# Patient Record
Sex: MALE | Race: WHITE | Employment: UNEMPLOYED | URBAN - METROPOLITAN AREA
[De-identification: names, ages, dates, MRNs, and addresses within clinical notes are randomized per-mention and may not be internally consistent; named-entity substitution may affect disease eponyms.]

---

## 2018-01-14 ENCOUNTER — HOSPITAL ENCOUNTER (EMERGENCY)
Age: 33
Discharge: HOME | End: 2018-01-14
Attending: EMERGENCY MEDICINE | Admitting: EMERGENCY MEDICINE
Payer: COMMERCIAL

## 2018-01-14 VITALS
HEIGHT: 72 IN | RESPIRATION RATE: 16 BRPM | TEMPERATURE: 98.4 F | BODY MASS INDEX: 20.05 KG/M2 | HEART RATE: 88 BPM | DIASTOLIC BLOOD PRESSURE: 80 MMHG | SYSTOLIC BLOOD PRESSURE: 156 MMHG | WEIGHT: 148 LBS | OXYGEN SATURATION: 100 %

## 2018-01-14 DIAGNOSIS — M26.609 TMJ DYSFUNCTION: Primary | ICD-10-CM

## 2018-01-14 PROCEDURE — 74011250636 HC RX REV CODE- 250/636: Performed by: PHYSICIAN ASSISTANT

## 2018-01-14 PROCEDURE — 99281 EMR DPT VST MAYX REQ PHY/QHP: CPT

## 2018-01-14 PROCEDURE — 96375 TX/PRO/DX INJ NEW DRUG ADDON: CPT

## 2018-01-14 PROCEDURE — 96361 HYDRATE IV INFUSION ADD-ON: CPT

## 2018-01-14 PROCEDURE — 96374 THER/PROPH/DIAG INJ IV PUSH: CPT

## 2018-01-14 PROCEDURE — 74011000250 HC RX REV CODE- 250: Performed by: PHYSICIAN ASSISTANT

## 2018-01-14 RX ORDER — PROCHLORPERAZINE EDISYLATE 5 MG/ML
10 INJECTION INTRAMUSCULAR; INTRAVENOUS
Status: DISCONTINUED | OUTPATIENT
Start: 2018-01-14 | End: 2018-01-14 | Stop reason: SDUPTHER

## 2018-01-14 RX ORDER — ONDANSETRON 2 MG/ML
4 INJECTION INTRAMUSCULAR; INTRAVENOUS
Status: COMPLETED | OUTPATIENT
Start: 2018-01-14 | End: 2018-01-14

## 2018-01-14 RX ORDER — DIPHENHYDRAMINE HYDROCHLORIDE 50 MG/ML
25 INJECTION, SOLUTION INTRAMUSCULAR; INTRAVENOUS
Status: COMPLETED | OUTPATIENT
Start: 2018-01-14 | End: 2018-01-14

## 2018-01-14 RX ORDER — KETOROLAC TROMETHAMINE 30 MG/ML
30 INJECTION, SOLUTION INTRAMUSCULAR; INTRAVENOUS
Status: COMPLETED | OUTPATIENT
Start: 2018-01-14 | End: 2018-01-14

## 2018-01-14 RX ORDER — DIAZEPAM 5 MG/1
5 TABLET ORAL
Qty: 20 TAB | Refills: 0 | Status: ON HOLD | OUTPATIENT
Start: 2018-01-14 | End: 2020-05-14

## 2018-01-14 RX ORDER — IBUPROFEN 800 MG/1
800 TABLET ORAL
Qty: 20 TAB | Refills: 0 | Status: SHIPPED | OUTPATIENT
Start: 2018-01-14 | End: 2018-01-21

## 2018-01-14 RX ADMIN — DIPHENHYDRAMINE HYDROCHLORIDE 25 MG: 50 INJECTION, SOLUTION INTRAMUSCULAR; INTRAVENOUS at 16:48

## 2018-01-14 RX ADMIN — KETOROLAC TROMETHAMINE 30 MG: 30 INJECTION, SOLUTION INTRAMUSCULAR at 17:50

## 2018-01-14 RX ADMIN — ONDANSETRON 4 MG: 2 INJECTION INTRAMUSCULAR; INTRAVENOUS at 16:48

## 2018-01-14 RX ADMIN — SODIUM CHLORIDE 10 MG: 9 INJECTION INTRAMUSCULAR; INTRAVENOUS; SUBCUTANEOUS at 16:48

## 2018-01-14 RX ADMIN — SODIUM CHLORIDE 1000 ML: 900 INJECTION, SOLUTION INTRAVENOUS at 16:47

## 2018-01-14 NOTE — DISCHARGE INSTRUCTIONS
Temporomandibular Disorder: Care Instructions  Your Care Instructions    Temporomandibular (TM) disorders are a problem with the muscles and joints that connect your jaw to your skull. They cause pain when you open your mouth, chew, or yawn. You may feel this pain on one or both sides. TM disorders are often caused by tight jaw muscles. The tightness can be caused by clenching or grinding your teeth. This may happen when you have a lot of stress in your life. If you lower your stress, you may be able to stop clenching or grinding your teeth. This will help relax your jaw and reduce your pain. You may also be able to do some things at home to feel better. But if none of this works, your doctor may prescribe medicine to help relax your muscles and control the pain. Follow-up care is a key part of your treatment and safety. Be sure to make and go to all appointments, and call your doctor if you are having problems. It's also a good idea to know your test results and keep a list of the medicines you take. How can you care for yourself at home? · Put a warm, moist cloth or heating pad set on low on your jaw. Do this for 10 to 20 minutes at a time. Put a thin cloth between the heating pad and your skin. · Avoid hard or chewy foods that cause your jaws to work very hard. Examples include popcorn, jerky, tough meats, chewy breads, gum, and raw apples and carrots. · Choose softer foods that are easy to chew. These include eggs, yogurt, and soup. · Cut your food into small pieces. Chew slowly. · If your jaw gets too painful to chew, or if it locks, you may need to puree your food for a few days or weeks. · To relax your jaw, repeat this exercise for a few minutes every morning and evening. Watch yourself in a mirror. Gently open and close your mouth. Move your jaw straight up and down. But don't do this if it makes your pain worse.   · Get at least 30 minutes of exercise on most days of the week to relieve stress. Walking is a good choice. You also may want to do other activities, such as running, swimming, cycling, or playing tennis or team sports. · Do not:  ¨ Hold a phone between your shoulder and your jaw. ¨ Open your mouth all the way, like when you sing loudly or yawn. ¨ Clench or grind your teeth, bite your lips, or chew your fingernails. ¨ Clench things such as pens, pipes, or cigars between your teeth. When should you call for help? Call your doctor now or seek immediate medical care if:  ? · Your jaw is locked open or shut or it is hard to move your jaw. ? Watch closely for changes in your health, and be sure to contact your doctor if:  ? · Your jaw pain gets worse. ? · Your face is swollen. ? · You do not get better as expected. Where can you learn more? Go to http://deidreDatamolinokristofer.info/. Enter T180 in the search box to learn more about \"Temporomandibular Disorder: Care Instructions. \"  Current as of: May 12, 2017  Content Version: 11.4  © 6797-2874 [employer]. Care instructions adapted under license by [employer] (which disclaims liability or warranty for this information). If you have questions about a medical condition or this instruction, always ask your healthcare professional. [address] any warranty or liability for your use of this information. We hope that we have addressed all of your medical concerns. The examination and treatment you received in the Emergency Department were for an emergent problem and were not intended as complete care. It is important that you follow up with your healthcare provider(s) for ongoing care. If your symptoms worsen or do not improve as expected, and you are unable to reach your usual health care provider(s), you should return to the Emergency Department.       Today's healthcare is undergoing tremendous change, and patient satisfaction surveys are one of the many tools to assess the quality of medical care. You may receive a survey from the [employer] organization regarding your experience in the Emergency Department. I hope that your experience has been completely positive, particularly the medical care that I provided. As such, please participate in the survey; anything less than excellent does not meet my expectations or intentions. [facility] and [facility] participate in nationally recognized quality of care measures. If your blood pressure is greater than 120/80, as reported below, we urge that you seek medical care to address the potential of high blood pressure, commonly known as hypertension. Hypertension can be hereditary or can be caused by certain medical conditions, pain, stress, or \"white coat syndrome. \"       Please make an appointment with your health care provider(s) for follow up of your Emergency Department visit. VITALS:   Patient Vitals for the past 8 hrs:   Temp Pulse Resp BP SpO2   01/14/18 1527 98.3 °F (36.8 °C) 93 16 134/87 99 %          Thank you for allowing us to provide you with medical care today. We realize that you have many choices for your emergency care needs. Please choose us in the future for any continued health care needs. [address], [address]: [phone]            No results found for this or any previous visit (from the past 24 hour(s)). No results found.

## 2018-01-14 NOTE — ED TRIAGE NOTES
Pt reports waking up with left jaw pain, left neck pain and pain above his left eye for the past 4 weeks. Pt states he was not able to close his mouth completely for a week but that has now subsided. Pt has been taking Excedrin, BC Powder, Motrin and Tylenol several times per day with minimal relief. Pt also reports light sensitivity.

## 2018-01-15 NOTE — ED PROVIDER NOTES
HPI Comments: 34 y.o. male with past medical history significant for bipolar disorder, substance abuse, and generalized anxiety disorder presents with complaints of left jaw pain headache and neck pain which began 4 weeks ago. The pt states that \"my jaw hurts when I chew food. \"  Denies drooling or trismus. The pt states that now he has a headache. The pt rates the pain as a 6/10 in severity. There is no radiation of the pain. The pt describes the pain as sharp and intermittent. The pt denies taking anything at home for the discomfort. There are no other acute medical complaints at this time. PCP: None    Domingo Tracy PA-C    Patient is a 34 y.o. male presenting with headaches. Headache    Pertinent negatives include no fever, no palpitations, no shortness of breath, no dizziness, no nausea and no vomiting. Past Medical History:   Diagnosis Date    Anxiety disorder     [facility] ([facility] [facility] 12/5/2011    Bipolar disorder ([facility] [facility]     Chronic pain     right foot and back pain    Depression     Mood disorder ([facility] [facility]     Self mutilation     hx of cutting self    Substance abuse     Suicidal thoughts        History reviewed. No pertinent surgical history. History reviewed. No pertinent family history. Social History     Social History    Marital status: SINGLE     Spouse name: N/A    Number of children: N/A    Years of education: N/A     Occupational History    Not on file.      Social History Main Topics    Smoking status: Current Every Day Smoker     Packs/day: 1.00     Years: 15.00     Types: Cigarettes    Smokeless tobacco: Never Used    Alcohol use Yes      Comment: Occasionally 10 beers a week    Drug use: No    Sexual activity: Yes     Partners: Female     Birth control/ protection: None     Other Topics Concern    Not on file     Social History Narrative         ALLERGIES: Ceclor [cefaclor]    Review of Systems   Constitutional: Negative for activity change, appetite change, diaphoresis and fever. HENT: Negative for ear discharge, ear pain, facial swelling, rhinorrhea, sore throat, tinnitus, trouble swallowing and voice change. Eyes: Negative for photophobia, pain, discharge, redness and visual disturbance. Respiratory: Negative for cough, chest tightness, shortness of breath, wheezing and stridor. Cardiovascular: Negative for chest pain and palpitations. Gastrointestinal: Negative for abdominal pain, constipation, diarrhea, nausea and vomiting. Endocrine: Negative for polydipsia and polyuria. Genitourinary: Negative for dysuria, flank pain and hematuria. Musculoskeletal: Negative for arthralgias, back pain and myalgias. Skin: Negative for color change and rash. Neurological: Positive for headaches. Negative for dizziness, syncope, speech difficulty, light-headedness and numbness. Psychiatric/Behavioral: Negative for behavioral problems. Vitals:    01/14/18 1527 01/14/18 1753   BP: 134/87 156/80   Pulse: 93 88   Resp: 16 16   Temp: 98.3 °F (36.8 °C) 98.4 °F (36.9 °C)   SpO2: 99% 100%   Weight: 67.1 kg (148 lb)    Height: 6' (1.829 m)             Physical Exam   Constitutional: He is oriented to person, place, and time. He appears well-developed and well-nourished. No distress. HENT:   Head: Normocephalic and atraumatic. Eyes: Conjunctivae are normal. Pupils are equal, round, and reactive to light. Neck: Normal range of motion. Neck supple. Cardiovascular: Normal rate, regular rhythm and normal heart sounds. Pulmonary/Chest: Effort normal and breath sounds normal. No respiratory distress. He has no wheezes. Abdominal: Soft. Bowel sounds are normal. He exhibits no distension. There is no tenderness. Musculoskeletal: Normal range of motion. No C, T, L, S spine tenderness. Pt has full mobility of upper and lower extremities. Pt is able to ambulate without difficulty. No perineal numbness. Pt is NVI.     Neurological: He is alert and oriented to person, place, and time. No Pronator Drift. Rapid Alternating movements are intact. Negative kernig's and brudzinski's sign. CN individually tested and are intact. Skin: Skin is warm. He is not diaphoretic. MDM  Number of Diagnoses or Management Options  TMJ dysfunction:   Diagnosis management comments: Pt reports relief of symptoms after fluids and headache medicines. Low index of suspicion for SAH, aneurysm, dissection, PE, MI, PTX or any other acute life threatening diseases. Symptoms consistent with TMJ disorder. Will treat with NSAIDs and muscle relaxer and advise close follow up with family doctor for further evaluation of symptoms. Reviewed treatment plan with attending and they agree.   Venus Allen PA-C    ED Course       Procedures

## 2019-09-09 ENCOUNTER — APPOINTMENT (OUTPATIENT)
Dept: CT IMAGING | Age: 34
End: 2019-09-09
Attending: EMERGENCY MEDICINE
Payer: COMMERCIAL

## 2019-09-09 ENCOUNTER — HOSPITAL ENCOUNTER (EMERGENCY)
Age: 34
Discharge: HOME | End: 2019-09-09
Attending: EMERGENCY MEDICINE
Payer: COMMERCIAL

## 2019-09-09 VITALS
SYSTOLIC BLOOD PRESSURE: 116 MMHG | DIASTOLIC BLOOD PRESSURE: 82 MMHG | OXYGEN SATURATION: 100 % | TEMPERATURE: 98 F | RESPIRATION RATE: 18 BRPM | HEART RATE: 74 BPM

## 2019-09-09 DIAGNOSIS — R51.9 NONINTRACTABLE HEADACHE, UNSPECIFIED CHRONICITY PATTERN, UNSPECIFIED HEADACHE TYPE: Primary | ICD-10-CM

## 2019-09-09 LAB
COMMENT, HOLDF: NORMAL
SAMPLES BEING HELD,HOLD: NORMAL

## 2019-09-09 PROCEDURE — 74011000250 HC RX REV CODE- 250: Performed by: EMERGENCY MEDICINE

## 2019-09-09 PROCEDURE — 70450 CT HEAD/BRAIN W/O DYE: CPT

## 2019-09-09 PROCEDURE — 74011250636 HC RX REV CODE- 250/636: Performed by: EMERGENCY MEDICINE

## 2019-09-09 PROCEDURE — 96374 THER/PROPH/DIAG INJ IV PUSH: CPT

## 2019-09-09 PROCEDURE — 36415 COLL VENOUS BLD VENIPUNCTURE: CPT

## 2019-09-09 PROCEDURE — 96375 TX/PRO/DX INJ NEW DRUG ADDON: CPT

## 2019-09-09 PROCEDURE — 99283 EMERGENCY DEPT VISIT LOW MDM: CPT

## 2019-09-09 RX ORDER — PROCHLORPERAZINE EDISYLATE 5 MG/ML
10 INJECTION INTRAMUSCULAR; INTRAVENOUS
Status: DISCONTINUED | OUTPATIENT
Start: 2019-09-09 | End: 2019-09-09 | Stop reason: CLARIF

## 2019-09-09 RX ORDER — DIPHENHYDRAMINE HYDROCHLORIDE 50 MG/ML
25 INJECTION, SOLUTION INTRAMUSCULAR; INTRAVENOUS
Status: COMPLETED | OUTPATIENT
Start: 2019-09-09 | End: 2019-09-09

## 2019-09-09 RX ADMIN — DIPHENHYDRAMINE HYDROCHLORIDE 25 MG: 50 INJECTION, SOLUTION INTRAMUSCULAR; INTRAVENOUS at 03:52

## 2019-09-09 RX ADMIN — SODIUM CHLORIDE 1000 ML: 900 INJECTION, SOLUTION INTRAVENOUS at 03:52

## 2019-09-09 RX ADMIN — SODIUM CHLORIDE 10 MG: 9 INJECTION INTRAMUSCULAR; INTRAVENOUS; SUBCUTANEOUS at 03:52

## 2019-09-09 NOTE — ED NOTES
Dr. Bhumika Mcpherson reviewed discharge instructions with the patient. The patient verbalized understanding. Patient ambulated out of the emergency department escorted by female . Patient remains in pain, but is in no apparent distress.

## 2019-09-09 NOTE — DISCHARGE INSTRUCTIONS

## 2019-09-09 NOTE — ED TRIAGE NOTES
Patient presents to the emergency department reporting sudden onset of intense headache onset about 45 minutes ago. Patient reports nausea. Patient received 30 mg of Toradol and 4 mg of Zofran en route.

## 2019-09-09 NOTE — ED PROVIDER NOTES
'had some drinks earluier today/ last one at 7:30 pm/ awoke 1 hour ago with a bad ha/ called 80    67-year-old male past medical history anxiety disorder, bipolar affective disorder, chronic pain of the right foot and back pain, depression, self-mutilation self cutting history, substance abuse and suicidal thoughts presents by ambulance complaining of a headache upon awakening this morning at approximately 1 AM.  Patient states he was fine when he went to bed earlier approximately 9. States he had been consuming alcohol through the day last drink was approximately 7:30 PM.  He states he does not normally get headaches and that why this was so concerning, causing him to call 911. Past Medical History:  No date: Anxiety disorder  12/5/2011: Bipolar [facility] disorder ([facility] [address]  No date: [facility]  No date: Chronic pain      Comment:  right foot and back pain  No date: Depression  No date: Mood disorder ([facility]  No date: Self mutilation      Comment:  hx of cutting self  No date: Substance abuse ([facility]  No date: Suicidal thoughts    History reviewed. No pertinent surgical history.     Social History    Socioeconomic History      Marital status: SINGLE      Spouse name: Not on file      Number of children: Not on file      Years of education: Not on file      Highest education level: Not on file    Occupational History      Not on file    Social Needs      Financial resource strain: Not on file      Food insecurity:        Worry: Not on file        Inability: Not on file      Transportation needs:        Medical: Not on file        Non-medical: Not on file    Tobacco Use      Smoking status: Current Every Day Smoker        Packs/day: 1.00        Years: 15.00        Pack years: 15        Types: Cigarettes      Smokeless tobacco: Never Used    Substance and Sexual Activity      Alcohol use: Yes        Comment: Occasionally 10 beers a week      Drug use: No      Sexual activity: Yes        Partners: Female Birth control/protection: None    Lifestyle      Physical activity:        Days per week: Not on file        Minutes per session: Not on file      Stress: Not on file    Relationships      Social connections:        Talks on phone: Not on file        Gets together: Not on file        Attends Buddhism service: Not on file        Active member of club or organization: Not on file        Attends meetings of clubs or organizations: Not on file        Relationship status: Not on file      Intimate partner violence:        Fear of current or ex partner: Not on file        Emotionally abused: Not on file        Physically abused: Not on file        Forced sexual activity: Not on file    Other Topics      Concerns:        Not on file    Social History Narrative      Not on file                 Past Medical History:   Diagnosis Date    Anxiety disorder     Bipolar affective disorder ([facility] [address] 12/5/2011    [facility]     Chronic pain     right foot and back pain    Depression     Mood disorder ([facility]     Self mutilation     hx of cutting self    Substance abuse     Suicidal thoughts        No past surgical history on file. No family history on file.     Social History     Socioeconomic History    Marital status: SINGLE     Spouse name: Not on file    Number of children: Not on file    Years of education: Not on file    Highest education level: Not on file   Occupational History    Not on file   Social Needs    Financial resource strain: Not on file    Food insecurity:     Worry: Not on file     Inability: Not on file    Transportation needs:     Medical: Not on file     Non-medical: Not on file   Tobacco Use    Smoking status: Current Every Day Smoker     Packs/day: 1.00     Years: 15.00     Pack years: 15.00     Types: Cigarettes    Smokeless tobacco: Never Used   Substance and Sexual Activity    Alcohol use: Yes     Comment: Occasionally 10 beers a week    Drug use: No    Sexual activity: Yes     Partners: Female     Birth control/protection: None   Lifestyle    Physical activity:     Days per week: Not on file     Minutes per session: Not on file    Stress: Not on file   Relationships    Social connections:     Talks on phone: Not on file     Gets together: Not on file     Attends Episcopal service: Not on file     Active member of club or organization: Not on file     Attends meetings of clubs or organizations: Not on file     Relationship status: Not on file    Intimate partner violence:     Fear of current or ex partner: Not on file     Emotionally abused: Not on file     Physically abused: Not on file     Forced sexual activity: Not on file   Other Topics Concern    Not on file   Social History Narrative    Not on file         ALLERGIES: Ceclor [cefaclor]    Review of Systems   Constitutional: Negative for chills and fever. HENT: Negative for trouble swallowing and voice change. Eyes: Negative for visual disturbance. Neurological: Positive for headaches. Negative for seizures and speech difficulty. All other systems reviewed and are negative. There were no vitals filed for this visit. Physical Exam   Constitutional: He is oriented to person, place, and time. He appears well-developed and well-nourished. No distress. BIBEMS Uncomfortable appearing, AxOx4, speaking in complete sentences, towell on his head;     gcs = 15   HENT:   Head: Normocephalic and atraumatic. Mouth/Throat: Oropharynx is clear and moist. No oropharyngeal exudate. Cn intact       Eyes: Pupils are equal, round, and reactive to light. Conjunctivae and EOM are normal. Right eye exhibits no discharge. Left eye exhibits no discharge. Neck: Normal range of motion. Neck supple. Cardiovascular: Normal rate, regular rhythm, normal heart sounds and intact distal pulses. Exam reveals no gallop and no friction rub. No murmur heard.   Pulmonary/Chest: Effort normal and breath sounds normal. No respiratory distress. He has no wheezes. He has no rales. He exhibits no tenderness. Abdominal: Soft. Bowel sounds are normal. He exhibits no distension and no mass. There is no tenderness. There is no rebound and no guarding. nttp   Genitourinary:   Genitourinary Comments: Pt denies urinary/ Testicular/ scrotal or penile  complaints   Musculoskeletal: Normal range of motion. He exhibits no edema, tenderness or deformity. Lymphadenopathy:     He has no cervical adenopathy. Neurological: He is alert and oriented to person, place, and time. He displays normal reflexes. No cranial nerve deficit or sensory deficit. He exhibits normal muscle tone. Coordination normal.   pt has motor/ CV/ Sensation grossly intact to all extremities, R = L in strength;   Skin: Skin is warm and dry. Capillary refill takes less than 2 seconds. No rash noted. No erythema. Psychiatric: He has a normal mood and affect. Nursing note and vitals reviewed. MDM       Procedures     4:22 AM Pt sleeping/ awakens to voice/ 'Ha better'; agrees w/ plans;   Nelly Calhonu  results have been reviewed with him. He has been counseled regarding his diagnosis. He verbally conveys understanding and agreement of the signs, symptoms, diagnosis, treatment and prognosis and additionally agrees to Call/ Arrange follow up as recommended in 24 - 48 hours. He also agrees with the care-plan and conveys that all of his questions have been answered. I have also put together some discharge instructions for him that include: 1) educational information regarding their diagnosis, 2) how to care for their diagnosis at home, as well a 3) list of reasons why they would want to return to the ED prior to their follow-up appointment, should their condition change or for concerns.

## 2020-02-04 ENCOUNTER — APPOINTMENT (OUTPATIENT)
Dept: GENERAL RADIOLOGY | Age: 35
End: 2020-02-04
Attending: EMERGENCY MEDICINE
Payer: COMMERCIAL

## 2020-02-04 ENCOUNTER — HOSPITAL ENCOUNTER (EMERGENCY)
Age: 35
Discharge: HOME | End: 2020-02-04
Attending: EMERGENCY MEDICINE | Admitting: EMERGENCY MEDICINE
Payer: COMMERCIAL

## 2020-02-04 VITALS
DIASTOLIC BLOOD PRESSURE: 72 MMHG | TEMPERATURE: 98.2 F | SYSTOLIC BLOOD PRESSURE: 111 MMHG | OXYGEN SATURATION: 99 % | HEART RATE: 73 BPM | RESPIRATION RATE: 16 BRPM

## 2020-02-04 DIAGNOSIS — S40.012A CONTUSION OF LEFT SHOULDER, INITIAL ENCOUNTER: ICD-10-CM

## 2020-02-04 DIAGNOSIS — S46.912A SHOULDER STRAIN, LEFT, INITIAL ENCOUNTER: Primary | ICD-10-CM

## 2020-02-04 PROCEDURE — 72050 X-RAY EXAM NECK SPINE 4/5VWS: CPT

## 2020-02-04 PROCEDURE — 74011250636 HC RX REV CODE- 250/636: Performed by: EMERGENCY MEDICINE

## 2020-02-04 PROCEDURE — 74011250637 HC RX REV CODE- 250/637: Performed by: EMERGENCY MEDICINE

## 2020-02-04 PROCEDURE — 73030 X-RAY EXAM OF SHOULDER: CPT

## 2020-02-04 PROCEDURE — 73502 X-RAY EXAM HIP UNI 2-3 VIEWS: CPT

## 2020-02-04 PROCEDURE — 99283 EMERGENCY DEPT VISIT LOW MDM: CPT

## 2020-02-04 PROCEDURE — 96372 THER/PROPH/DIAG INJ SC/IM: CPT

## 2020-02-04 RX ORDER — KETOROLAC TROMETHAMINE 30 MG/ML
30 INJECTION, SOLUTION INTRAMUSCULAR; INTRAVENOUS
Status: COMPLETED | OUTPATIENT
Start: 2020-02-04 | End: 2020-02-04

## 2020-02-04 RX ORDER — ONDANSETRON 4 MG/1
8 TABLET, ORALLY DISINTEGRATING ORAL
Status: COMPLETED | OUTPATIENT
Start: 2020-02-04 | End: 2020-02-04

## 2020-02-04 RX ORDER — CYCLOBENZAPRINE HCL 10 MG
10 TABLET ORAL
Status: COMPLETED | OUTPATIENT
Start: 2020-02-04 | End: 2020-02-04

## 2020-02-04 RX ORDER — IBUPROFEN 800 MG/1
800 TABLET ORAL
Qty: 20 TAB | Refills: 0 | Status: SHIPPED | OUTPATIENT
Start: 2020-02-04 | End: 2020-02-11

## 2020-02-04 RX ORDER — CYCLOBENZAPRINE HCL 10 MG
10 TABLET ORAL
Qty: 10 TAB | Refills: 0 | Status: ON HOLD | OUTPATIENT
Start: 2020-02-04 | End: 2020-05-14

## 2020-02-04 RX ADMIN — CYCLOBENZAPRINE 10 MG: 10 TABLET, FILM COATED ORAL at 06:23

## 2020-02-04 RX ADMIN — ONDANSETRON 8 MG: 4 TABLET, ORALLY DISINTEGRATING ORAL at 05:38

## 2020-02-04 RX ADMIN — KETOROLAC TROMETHAMINE 30 MG: 30 INJECTION, SOLUTION INTRAMUSCULAR at 05:51

## 2020-02-04 NOTE — DISCHARGE INSTRUCTIONS
Patient Education   Patient Education      Xrays tonight did not show any underlying bone injury (no fracture, dislocation, separation, etc.)  I suspect your pain is more from bruising and strain/spasm of the muscles surrounding your shoulder/back. I have prescribed high dose Ibuprofen for the pain as well as flexeril for muscle spasm. Apply warm heat for 20 minutes several times a day as well. You should be feeling better in 5-7 days. If your pain is getting worse, not better, or you develop new or worsening symptoms- return here for re-evaluation. Shoulder Pain: Care Instructions  Your Care Instructions    You can hurt your shoulder by using it too much during an activity, such as fishing or baseball. It can also happen as part of the everyday wear and tear of getting older. Shoulder injuries can be slow to heal, but your shoulder should get better with time. Your doctor may recommend a sling to rest your shoulder. If you have injured your shoulder, you may need testing and treatment. Follow-up care is a key part of your treatment and safety. Be sure to make and go to all appointments, and call your doctor if you are having problems. It's also a good idea to know your test results and keep a list of the medicines you take. How can you care for yourself at home? · Take pain medicines exactly as directed. ? If the doctor gave you a prescription medicine for pain, take it as prescribed. ? If you are not taking a prescription pain medicine, ask your doctor if you can take an over-the-counter medicine. ? Do not take two or more pain medicines at the same time unless the doctor told you to. Many pain medicines contain acetaminophen, which is Tylenol. Too much acetaminophen (Tylenol) can be harmful. · If your doctor recommends that you wear a sling, use it as directed. Do not take it off before your doctor tells you to. · Put ice or a cold pack on the sore area for 10 to 20 minutes at a time.  Put a thin cloth between the ice and your skin. · If there is no swelling, you can put moist heat, a heating pad, or a warm cloth on your shoulder. Some doctors suggest alternating between hot and cold. · Rest your shoulder for a few days. If your doctor recommends it, you can then begin gentle exercise of the shoulder, but do not lift anything heavy. When should you call for help? Call 911 anytime you think you may need emergency care. For example, call if:    · You have chest pain or pressure. This may occur with:  ? Sweating. ? Shortness of breath. ? Nausea or vomiting. ? Pain that spreads from the chest to the neck, jaw, or one or both shoulders or arms. ? Dizziness or lightheadedness. ? A fast or uneven pulse. After calling 911, chew 1 adult-strength aspirin. Wait for an ambulance. Do not try to drive yourself.     · Your arm or hand is cool or pale or changes color.    Call your doctor now or seek immediate medical care if:    · You have signs of infection, such as:  ? Increased pain, swelling, warmth, or redness in your shoulder. ? Red streaks leading from a place on your shoulder. ? Pus draining from an area of your shoulder. ? Swollen lymph nodes in your neck, armpits, or groin. ? A fever.    Watch closely for changes in your health, and be sure to contact your doctor if:    · You cannot use your shoulder.     · Your shoulder does not get better as expected. Where can you learn more? Go to http://deidre-kristofer.info/. Enter J328 in the search box to learn more about \"Shoulder Pain: Care Instructions. \"  Current as of: June 26, 2019  Content Version: 12.2  © 4011-6161 [employer]. Care instructions adapted under license by [employer] (which disclaims liability or warranty for this information).  If you have questions about a medical condition or this instruction, always ask your healthcare professional. [employer] disclaims any warranty or liability for your use of this information. Contusion: Care Instructions  Your Care Instructions    Contusion is the medical term for a bruise. It is the result of a direct blow or an impact, such as a fall. Contusions are common sports injuries. Most people think of a bruise as a black-and-blue spot. This happens when small blood vessels get torn and leak blood under the skin. But bones, muscles, and organs can also get bruised. This may damage deep tissues but not cause a bruise you can see. The doctor will do a physical exam to find the location of your contusion. You may also have tests to make sure you do not have a more serious injury, such as a broken bone or nerve damage. These may include X-rays or other imaging tests like a CT scan or MRI. Deep-tissue contusions may cause pain and swelling. But if there is no serious damage, they will often get better in a few weeks with home treatment. The doctor has checked you carefully, but problems can develop later. If you notice any problems or new symptoms, get medical treatment right away. Follow-up care is a key part of your treatment and safety. Be sure to make and go to all appointments, and call your doctor if you are having problems. It's also a good idea to know your test results and keep a list of the medicines you take. How can you care for yourself at home? · Put ice or a cold pack on the sore area for 10 to 20 minutes at a time to stop swelling. Put a thin cloth between the ice pack and your skin. · Be safe with medicines. Read and follow all instructions on the label. ? If the doctor gave you a prescription medicine for pain, take it as prescribed. ? If you are not taking a prescription pain medicine, ask your doctor if you can take an over-the-counter medicine. · If you can, prop up the sore area on pillows as much as possible for the next few days. Try to keep the sore area above the level of your heart.   When should you call for help?  Call your doctor now or seek immediate medical care if:    · Your pain gets worse.     · You have new or worse swelling.     · You have tingling, weakness, or numbness in the area near the contusion.     · The area near the contusion is cold or pale.    Watch closely for changes in your health, and be sure to contact your doctor if:    · You do not get better as expected. Where can you learn more? Go to http://deidre-kristofer.info/. Enter E262 in the search box to learn more about \"Contusion: Care Instructions. \"  Current as of: June 26, 2019  Content Version: 12.2  © 4364-5718 [employer]. Care instructions adapted under license by [employer] (which disclaims liability or warranty for this information). If you have questions about a medical condition or this instruction, always ask your healthcare professional. [address] any warranty or liability for your use of this information.

## 2020-02-04 NOTE — LETTER
James Perkins 54 
[address] [facility] [address] [id number] 
[phone] Work/School Note Date: 2/4/2020 To Whom It May concern: 
 
Gregg Neumann was seen and treated today in the emergency room by the following provider(s): 
Attending Provider: Dennys Holm MD. Gregg Neumann may return to work on Thursday Feb. 6. 2020. No heavy lifting over 5 pounds with left arm for 2 weeks.   
 
Sincerely, 
 
 
 
 
Beau Ramires MD
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
## 2020-05-14 ENCOUNTER — HOSPITAL ENCOUNTER (INPATIENT)
Age: 35
LOS: 2 days | Discharge: PSYCHIATRIC OR SUBSTANCE USE FACILITY | DRG: 917 | End: 2020-05-16
Attending: EMERGENCY MEDICINE | Admitting: INTERNAL MEDICINE
Payer: COMMERCIAL

## 2020-05-14 DIAGNOSIS — T50.902A INTENTIONAL DRUG OVERDOSE, INITIAL ENCOUNTER (HCC): Primary | ICD-10-CM

## 2020-05-14 DIAGNOSIS — T50.902A SUICIDE ATTEMPT BY DRUG INGESTION, INITIAL ENCOUNTER (HCC): ICD-10-CM

## 2020-05-14 PROBLEM — T50.901A OVERDOSE: Status: ACTIVE | Noted: 2020-05-14

## 2020-05-14 LAB
ALBUMIN SERPL-MCNC: 4.5 G/DL (ref 3.5–5)
ALBUMIN/GLOB SERPL: 1.2 {RATIO} (ref 1.1–2.2)
ALP SERPL-CCNC: 58 U/L (ref 45–117)
ALT SERPL-CCNC: 21 U/L (ref 12–78)
AMPHET UR QL SCN: POSITIVE
ANION GAP SERPL CALC-SCNC: 11 MMOL/L (ref 5–15)
APAP SERPL-MCNC: <2 UG/ML (ref 10–30)
APPEARANCE UR: CLEAR
AST SERPL-CCNC: 14 U/L (ref 15–37)
ATRIAL RATE: 99 BPM
BARBITURATES UR QL SCN: NEGATIVE
BASOPHILS # BLD: 0.1 K/UL (ref 0–0.1)
BASOPHILS NFR BLD: 1 % (ref 0–1)
BENZODIAZ UR QL: NEGATIVE
BILIRUB SERPL-MCNC: 0.2 MG/DL (ref 0.2–1)
BILIRUB UR QL: NEGATIVE
BUN SERPL-MCNC: 10 MG/DL (ref 6–20)
BUN/CREAT SERPL: 10 (ref 12–20)
CALCIUM SERPL-MCNC: 9.3 MG/DL (ref 8.5–10.1)
CALCULATED P AXIS, ECG09: 68 DEGREES
CALCULATED R AXIS, ECG10: 77 DEGREES
CALCULATED T AXIS, ECG11: 67 DEGREES
CANNABINOIDS UR QL SCN: NEGATIVE
CHLORIDE SERPL-SCNC: 106 MMOL/L (ref 97–108)
CO2 SERPL-SCNC: 20 MMOL/L (ref 21–32)
COCAINE UR QL SCN: NEGATIVE
COLOR UR: ABNORMAL
CREAT SERPL-MCNC: 1.04 MG/DL (ref 0.7–1.3)
DIAGNOSIS, 93000: NORMAL
DIFFERENTIAL METHOD BLD: NORMAL
DRUG SCRN COMMENT,DRGCM: ABNORMAL
EOSINOPHIL # BLD: 0.1 K/UL (ref 0–0.4)
EOSINOPHIL NFR BLD: 1 % (ref 0–7)
ERYTHROCYTE [DISTWIDTH] IN BLOOD BY AUTOMATED COUNT: 13.2 % (ref 11.5–14.5)
ETHANOL SERPL-MCNC: 96 MG/DL
GLOBULIN SER CALC-MCNC: 3.7 G/DL (ref 2–4)
GLUCOSE SERPL-MCNC: 101 MG/DL (ref 65–100)
GLUCOSE UR STRIP.AUTO-MCNC: NEGATIVE MG/DL
HCT VFR BLD AUTO: 47.9 % (ref 36.6–50.3)
HGB BLD-MCNC: 15.2 G/DL (ref 12.1–17)
HGB UR QL STRIP: NEGATIVE
IMM GRANULOCYTES # BLD AUTO: 0 K/UL (ref 0–0.04)
IMM GRANULOCYTES NFR BLD AUTO: 0 % (ref 0–0.5)
KETONES UR QL STRIP.AUTO: 15 MG/DL
LEUKOCYTE ESTERASE UR QL STRIP.AUTO: NEGATIVE
LYMPHOCYTES # BLD: 2.2 K/UL (ref 0.8–3.5)
LYMPHOCYTES NFR BLD: 24 % (ref 12–49)
MAGNESIUM SERPL-MCNC: 2.5 MG/DL (ref 1.6–2.4)
MCH RBC QN AUTO: 29.2 PG (ref 26–34)
MCHC RBC AUTO-ENTMCNC: 31.7 G/DL (ref 30–36.5)
MCV RBC AUTO: 92.1 FL (ref 80–99)
METHADONE UR QL: NEGATIVE
MONOCYTES # BLD: 0.8 K/UL (ref 0–1)
MONOCYTES NFR BLD: 8 % (ref 5–13)
NEUTS SEG # BLD: 6.1 K/UL (ref 1.8–8)
NEUTS SEG NFR BLD: 66 % (ref 32–75)
NITRITE UR QL STRIP.AUTO: NEGATIVE
NRBC # BLD: 0 K/UL (ref 0–0.01)
NRBC BLD-RTO: 0 PER 100 WBC
OPIATES UR QL: NEGATIVE
P-R INTERVAL, ECG05: 158 MS
PCP UR QL: NEGATIVE
PH UR STRIP: 5.5 [PH] (ref 5–8)
PHOSPHATE SERPL-MCNC: 3.2 MG/DL (ref 2.6–4.7)
PLATELET # BLD AUTO: 354 K/UL (ref 150–400)
PMV BLD AUTO: 9.4 FL (ref 8.9–12.9)
POTASSIUM SERPL-SCNC: 4.2 MMOL/L (ref 3.5–5.1)
PROT SERPL-MCNC: 8.2 G/DL (ref 6.4–8.2)
PROT UR STRIP-MCNC: NEGATIVE MG/DL
Q-T INTERVAL, ECG07: 382 MS
QRS DURATION, ECG06: 88 MS
QTC CALCULATION (BEZET), ECG08: 490 MS
RBC # BLD AUTO: 5.2 M/UL (ref 4.1–5.7)
SALICYLATES SERPL-MCNC: <1.7 MG/DL (ref 2.8–20)
SODIUM SERPL-SCNC: 137 MMOL/L (ref 136–145)
SP GR UR REFRACTOMETRY: 1.03 (ref 1–1.03)
UR CULT HOLD, URHOLD: NORMAL
UROBILINOGEN UR QL STRIP.AUTO: 0.2 EU/DL (ref 0.2–1)
VENTRICULAR RATE, ECG03: 99 BPM
WBC # BLD AUTO: 9.3 K/UL (ref 4.1–11.1)

## 2020-05-14 PROCEDURE — 96374 THER/PROPH/DIAG INJ IV PUSH: CPT

## 2020-05-14 PROCEDURE — 96375 TX/PRO/DX INJ NEW DRUG ADDON: CPT

## 2020-05-14 PROCEDURE — 85025 COMPLETE CBC W/AUTO DIFF WBC: CPT

## 2020-05-14 PROCEDURE — 74011250636 HC RX REV CODE- 250/636: Performed by: EMERGENCY MEDICINE

## 2020-05-14 PROCEDURE — 84100 ASSAY OF PHOSPHORUS: CPT

## 2020-05-14 PROCEDURE — 74011250637 HC RX REV CODE- 250/637: Performed by: NURSE PRACTITIONER

## 2020-05-14 PROCEDURE — 99285 EMERGENCY DEPT VISIT HI MDM: CPT

## 2020-05-14 PROCEDURE — 96361 HYDRATE IV INFUSION ADD-ON: CPT

## 2020-05-14 PROCEDURE — 81003 URINALYSIS AUTO W/O SCOPE: CPT

## 2020-05-14 PROCEDURE — 74011250637 HC RX REV CODE- 250/637: Performed by: INTERNAL MEDICINE

## 2020-05-14 PROCEDURE — 93005 ELECTROCARDIOGRAM TRACING: CPT

## 2020-05-14 PROCEDURE — 65660000001 HC RM ICU INTERMED STEPDOWN

## 2020-05-14 PROCEDURE — 74011250636 HC RX REV CODE- 250/636: Performed by: INTERNAL MEDICINE

## 2020-05-14 PROCEDURE — 83735 ASSAY OF MAGNESIUM: CPT

## 2020-05-14 PROCEDURE — 80053 COMPREHEN METABOLIC PANEL: CPT

## 2020-05-14 PROCEDURE — 80307 DRUG TEST PRSMV CHEM ANLYZR: CPT

## 2020-05-14 PROCEDURE — 90791 PSYCH DIAGNOSTIC EVALUATION: CPT

## 2020-05-14 PROCEDURE — 36415 COLL VENOUS BLD VENIPUNCTURE: CPT

## 2020-05-14 RX ORDER — SODIUM CHLORIDE 9 MG/ML
125 INJECTION, SOLUTION INTRAVENOUS CONTINUOUS
Status: DISCONTINUED | OUTPATIENT
Start: 2020-05-14 | End: 2020-05-15

## 2020-05-14 RX ORDER — CLONAZEPAM 1 MG/1
1 TABLET ORAL 2 TIMES DAILY
COMMUNITY
End: 2020-05-22

## 2020-05-14 RX ORDER — LORAZEPAM 2 MG/ML
0.5 INJECTION INTRAMUSCULAR
Status: COMPLETED | OUTPATIENT
Start: 2020-05-14 | End: 2020-05-14

## 2020-05-14 RX ORDER — LAMOTRIGINE 100 MG/1
100 TABLET ORAL
COMMUNITY
End: 2020-05-22

## 2020-05-14 RX ORDER — FLUOXETINE HYDROCHLORIDE 40 MG/1
40 CAPSULE ORAL DAILY
COMMUNITY
End: 2020-05-22

## 2020-05-14 RX ORDER — LORAZEPAM 2 MG/ML
2 INJECTION INTRAMUSCULAR
Status: DISCONTINUED | OUTPATIENT
Start: 2020-05-14 | End: 2020-05-16

## 2020-05-14 RX ORDER — IBUPROFEN 200 MG
1 TABLET ORAL EVERY 24 HOURS
Status: DISCONTINUED | OUTPATIENT
Start: 2020-05-14 | End: 2020-05-16

## 2020-05-14 RX ORDER — BUPROPION HYDROCHLORIDE 150 MG/1
150 TABLET ORAL DAILY
COMMUNITY
End: 2020-05-22

## 2020-05-14 RX ORDER — SODIUM CHLORIDE 0.9 % (FLUSH) 0.9 %
5-40 SYRINGE (ML) INJECTION EVERY 8 HOURS
Status: DISCONTINUED | OUTPATIENT
Start: 2020-05-14 | End: 2020-05-16 | Stop reason: HOSPADM

## 2020-05-14 RX ORDER — LANOLIN ALCOHOL/MO/W.PET/CERES
3 CREAM (GRAM) TOPICAL
Status: DISCONTINUED | OUTPATIENT
Start: 2020-05-14 | End: 2020-05-16 | Stop reason: HOSPADM

## 2020-05-14 RX ORDER — ONDANSETRON 2 MG/ML
4 INJECTION INTRAMUSCULAR; INTRAVENOUS
Status: COMPLETED | OUTPATIENT
Start: 2020-05-14 | End: 2020-05-14

## 2020-05-14 RX ORDER — SODIUM CHLORIDE 0.9 % (FLUSH) 0.9 %
5-40 SYRINGE (ML) INJECTION AS NEEDED
Status: DISCONTINUED | OUTPATIENT
Start: 2020-05-14 | End: 2020-05-16 | Stop reason: HOSPADM

## 2020-05-14 RX ADMIN — LORAZEPAM 2 MG: 2 INJECTION INTRAMUSCULAR; INTRAVENOUS at 15:57

## 2020-05-14 RX ADMIN — SODIUM CHLORIDE 125 ML/HR: 900 INJECTION, SOLUTION INTRAVENOUS at 12:15

## 2020-05-14 RX ADMIN — MELATONIN 3 MG: at 21:21

## 2020-05-14 RX ADMIN — LORAZEPAM 2 MG: 2 INJECTION INTRAMUSCULAR; INTRAVENOUS at 21:10

## 2020-05-14 RX ADMIN — Medication 10 ML: at 19:58

## 2020-05-14 RX ADMIN — ONDANSETRON 4 MG: 2 INJECTION INTRAMUSCULAR; INTRAVENOUS at 08:40

## 2020-05-14 RX ADMIN — LORAZEPAM 0.5 MG: 2 INJECTION INTRAMUSCULAR; INTRAVENOUS at 09:10

## 2020-05-14 RX ADMIN — LORAZEPAM 2 MG: 2 INJECTION INTRAMUSCULAR; INTRAVENOUS at 12:58

## 2020-05-14 RX ADMIN — SODIUM CHLORIDE 1000 ML: 900 INJECTION, SOLUTION INTRAVENOUS at 08:40

## 2020-05-14 RX ADMIN — Medication 10 ML: at 21:17

## 2020-05-14 RX ADMIN — Medication 10 ML: at 15:59

## 2020-05-14 RX ADMIN — SODIUM CHLORIDE 125 ML/HR: 900 INJECTION, SOLUTION INTRAVENOUS at 20:00

## 2020-05-14 RX ADMIN — LORAZEPAM 2 MG: 2 INJECTION INTRAMUSCULAR; INTRAVENOUS at 19:57

## 2020-05-14 NOTE — H&P
History and Physical  Primary Care Provider: None    Subjective:     Wendy Chi is a 27 y.o. male who presents with     Pt is a 27 y.o. M with PMH of anxiety and bipolar disorder and depression here with c/o overdose around 9:30 pm.  He took twenty-nine 150 mg wellbutrin tablets, twenty nine 40 mg prozac tablets, and thirty 100 mg lamictal tablets with alcohol. He text his girlfriend who called 911. Upon ems arrival to the seen they found 3 empty bottles. Pt says prior to last night he had only taken 1 tablet from the prozac and lamictal bottles thus it is assumed 29 tablets were present prior to overdose. He denies drug use. EMS also state pt had written on the wall DNR. Pt has had vomiting since the incident and says he did notice pill fragments initially. No other complaints at this time. Hx limited as pt keeps asking if he is dead or if this is really happening and continues to repeat himself. On my HP. Patient reported that he took multiple medications of Wellbutrin, Prozac, and Lamictal. Patient appears to be confused. But was able to answer my questions. Patient did not report any fever, any SOB, any other associated problems. Admitted to [facility]. I also talked ot ICU/CCU attending and updated them that we may need to contact you in case patient deteriorates. Review of Systems:    A comprehensive review of systems was negative except for that written in the History of Present Illness. Past Medical History:   Diagnosis Date    Anxiety disorder     Bipolar affective disorder ([facility] [address] 12/5/2011    Bipolar disorder ([facility] [address]     Chronic pain     right foot and back pain    Depression     Mood disorder ([facility] [address]     Self mutilation     hx of cutting self    Substance abuse ([facility] [address]     Suicidal thoughts       History reviewed. No pertinent surgical history. Prior to Admission medications    Medication Sig Start Date End Date Taking?  Authorizing Provider   cyclobenzaprine (FLEXERIL) 10 mg tablet Take 1 Tab by mouth three (3) times daily as needed for Muscle Spasm(s). 2/4/20   Briana Faustin MD   diazePAM (VALIUM) 5 mg tablet Take 1 Tab by mouth three (3) times daily as needed for Anxiety (spasm). Max Daily Amount: 15 mg. 1/14/18   Noni Bolivar PA-C   Desvenlafaxine SR (PRISTIQ) 50 mg tablet Take 50 mg by mouth daily. OtherShawna MD   Lisdexamfetamine (VYVANSE) 40 mg capsule Take 40 mg by mouth daily. Other, MD Shawna   clonazePAM (KLONOPIN) 1 mg tablet Take 1 mg by mouth as needed. OtherShawna MD   zolpidem (AMBIEN) 10 mg tablet Take 1 Tab by mouth nightly as needed for Sleep. 12/6/11   Pete Ross MD     Allergies   Allergen Reactions    Ceclor [Cefaclor] Unknown (comments)      History reviewed. No pertinent family history. SOCIAL HISTORY:  Patient resides at Home. Patient ambulates with out help . Tobacco Use    Smoking status: Current Every Day Smoker       Packs/day: 1.00       Years: 15.00       Pack years: 15.00       Types: Cigarettes    Smokeless tobacco: Never Used   Substance and Sexual Activity    Alcohol use: Yes       Comment: Occasionally 10 beers a week    Drug use: No    Sexual activity: Yes       Partners: Female       Birth control/protection: None             Objective:       Physical Exam:   Visit Vitals  /84   Pulse (!) 104   Temp 99.2 °F (37.3 °C)   Resp 20   SpO2 95%     General:  Alert, but appears to be confused . Head:  Normocephalic, without obvious abnormality, atraumatic. Lungs:   Clear to auscultation bilaterally. Chest wall:  No tenderness or deformity. Heart:  Regular rate and rhythm, S1, S2 normal, no murmur, click, rub or gallop. Abdomen:   Soft, non-tender. Bowel sounds normal. No masses,  No organomegaly. Extremities: Extremities normal, atraumatic, no cyanosis or edema. Pulses: 2+ and symmetric all extremities. ECG:  Normal sinus rhythm    Data Review:    All diagnostic labs and studies have been reviewed. Assessment:     Active Problems:    Overdose (5/14/2020)        Plan:     1. Overdose  Poison control was contacted by ED  Recommended to continue with lorazepam   Patient admitted to [facility]   Will closely monitor  If he develops any severe signs of serotonin syndrome likely transfer to ICU  At this point patient is clinically stable, not tachycardiac or tachypneaic, normal temperaure   Psychiatry consulted       # prolonged QTc  Likely due ot overdose  Will repeat EKG tomorrow AM    # confusion  Due ot overdose  On IV fluids  Likely will improve with time     # h/o bipolar disorder  Psychiatry consulted     #/ ho alcohol intake  CIWA protocol        FUNCTIONAL STATUS PRIOR TO HOSPITALIZATION (including history of recent falls):able to ambulate himself.       Signed By: Tamia Russell MD     May 14, 2020

## 2020-05-14 NOTE — ED TRIAGE NOTES
Patient presents from home via EMS with complaints of drug overdose that occurred \"sometime when it was dark\" last night. Patient reports takin 150 mg Wellbutrin  29 40 mg Prozac  30 100 mg Lamictal     Per EMs pateint admits to drinknig some ETOH.   EMS also reports there was \"natural burial\" and \"DNR\" written on the wall in his room

## 2020-05-14 NOTE — ED PROVIDER NOTES
HPI     Pt is a 27 y.o. M with PMH of anxiety and bipolar disorder and depression here with c/o overdose around 9:30 pm.  He took twenty-nine 150 mg wellbutrin tablets, twenty nine 40 mg prozac tablets, and thirty 100 mg lamictal tablets with alcohol. He text his girlfriend who called 911. Upon ems arrival to the seen they found 3 empty bottles. Pt says prior to last night he had only taken 1 tablet from the prozac and lamictal bottles thus it is assumed 29 tablets were present prior to overdose. He denies drug use. EMS also state pt had written on the wall DNR. Pt has had vomiting since the incident and says he did notice pill fragments initially. No other complaints at this time. Hx limited as pt keeps asking if he is dead or if this is really happening and continues to repeat himself. Past Medical History:   Diagnosis Date    Anxiety disorder     Bipolar affective disorder ([facility] 12/5/2011    Bipolar disorder ([facility]     Chronic pain     right foot and back pain    Depression     Mood disorder ([facility]     Self mutilation     hx of cutting self    Substance abuse ([facility]     Suicidal thoughts        History reviewed. No pertinent surgical history. History reviewed. No pertinent family history.     Social History     Socioeconomic History    Marital status: SINGLE     Spouse name: Not on file    Number of children: Not on file    Years of education: Not on file    Highest education level: Not on file   Occupational History    Not on file   Social Needs    Financial resource strain: Not on file    Food insecurity     Worry: Not on file     Inability: Not on file    Transportation needs     Medical: Not on file     Non-medical: Not on file   Tobacco Use    Smoking status: Current Every Day Smoker     Packs/day: 1.00     Years: 15.00     Pack years: 15.00     Types: Cigarettes    Smokeless tobacco: Never Used   Substance and Sexual Activity    Alcohol use: Yes     Comment: Occasionally 10 beers a week    Drug use: No    Sexual activity: Yes     Partners: Female     Birth control/protection: None   Lifestyle    Physical activity     Days per week: Not on file     Minutes per session: Not on file    Stress: Not on file   Relationships    Social connections     Talks on phone: Not on file     Gets together: Not on file     Attends Sikhism service: Not on file     Active member of club or organization: Not on file     Attends meetings of clubs or organizations: Not on file     Relationship status: Not on file    Intimate partner violence     Fear of current or ex partner: Not on file     Emotionally abused: Not on file     Physically abused: Not on file     Forced sexual activity: Not on file   Other Topics Concern    Not on file   Social History Narrative    Not on file         ALLERGIES: Ceclor [cefaclor]    Review of Systems   Constitutional: Positive for fatigue. Negative for chills, diaphoresis and fever. HENT: Negative for congestion and trouble swallowing. Eyes: Negative for photophobia and visual disturbance. Respiratory: Negative for cough, chest tightness and shortness of breath. Cardiovascular: Negative for chest pain, palpitations and leg swelling. Gastrointestinal: Positive for nausea and vomiting. Negative for abdominal pain and diarrhea. Genitourinary: Negative for difficulty urinating, dysuria, flank pain and frequency. Musculoskeletal: Negative for back pain and myalgias. Skin: Negative for rash and wound. Neurological: Positive for tremors, speech difficulty and weakness. Negative for dizziness, light-headedness and headaches. Hematological: Negative for adenopathy. Does not bruise/bleed easily. Psychiatric/Behavioral: Positive for confusion, self-injury and suicidal ideas. Negative for agitation. All other systems reviewed and are negative.       Vitals:    05/14/20 0835   BP: (!) 126/91   Pulse: (!) 105   Resp: 18   Temp: 98.9 °F (37.2 °C)   SpO2: 98%            Physical Exam  Constitutional:       General: He is in acute distress. Comments: Actively vomiting   HENT:      Head: Normocephalic and atraumatic. Mouth/Throat:      Mouth: Mucous membranes are dry. Eyes:      Pupils: Pupils are equal, round, and reactive to light. Comments: Pt with some nystagmus and unable to tract with EOM   Neck:      Musculoskeletal: Normal range of motion. No neck rigidity. Cardiovascular:      Rate and Rhythm: Regular rhythm. Tachycardia present. Pulses: Normal pulses. Heart sounds: Normal heart sounds. Pulmonary:      Effort: Pulmonary effort is normal.      Breath sounds: Normal breath sounds. Abdominal:      General: Abdomen is flat. Tenderness: There is no abdominal tenderness. Musculoskeletal:         General: No deformity or signs of injury. Right lower leg: No edema. Left lower leg: No edema. Skin:     General: Skin is warm and dry. Capillary Refill: Capillary refill takes less than 2 seconds. Coloration: Skin is pale. Findings: No erythema. Neurological:      Mental Status: He is alert and oriented to person, place, and time. Coordination: Coordination abnormal.      Gait: Gait abnormal (unsteady). Comments: Pt has slurred speech  He is repeating questions  Seems tremulous and mildly confused about the events of what happened but oriented. Psychiatric:         Mood and Affect: Mood is depressed. Affect is flat. Thought Content: Thought content includes suicidal ideation. Thought content includes suicidal plan. Judgment: Judgment is impulsive and inappropriate. MDM       Procedures    8:35 AM  Poison control notified ([place])  Somnolence, QT prolongation, HTN, tachycardia or dysrhythmia, serotonin syndrome, seizures, tremors, hyperthermia, CNS depression, confusion, slurred speech are all things to look for.   The Wellbutrin is most concerning and of course the combination of all is concerning. He is 12 hrs out. Recommended 24 admit. Benzo (for agitation, serotonin syndrome, seizure) and IVF for symptomatic care. ED EKG interpretation:  Rhythm: normal sinus rhythm; and regular . Rate (approx.): 99; Axis: normal; P wave: normal; QRS interval: normal ; prolonged QT; ST/T wave: normal; EKG documented by Aminta Rob MD, as interpreted by Orlando Issa MD, ED MD.      9:17 AM  Pt's mother has called but pt will not allow me to give her information about what happened. He will allow the nurse to speak to his partner. Mom ([address] [place] [address]) [phone]     Hospitalist Perfect Serve for Admission  10:03 AM to Dr. Maren Johns    ED Room Number: ER12/12  Patient Name and age:  Da De La Fuente 27 y.o.  male  Working Diagnosis:   1. Intentional drug overdose, initial encounter ([facility]    2. Suicide [facility] by drug ingestion, initial encounter ([facility]        COVID-19 Suspicion:  no    Code Status:  Full Code  Readmission: no  Isolation Requirements:  no  Recommended Level of Care:  telemetry  Department:[facility] Adult ED - 21   Other:  Pt overdosed on wellbutrin, lamictal and prozac at 9 pm yesterday. With tremors, slurred speech, some confusion and vomiting and prolonged QT and tachy. Poison center recommends 24 hr med admit with symptomatic care with benzo as needed and IVF and monitor for serotonin syndrome, seizures.     10:12 AM  Pt accepted by Dr. Evi Fofana MD

## 2020-05-14 NOTE — PROGRESS NOTES
1157: Received report from ED.     1204: Patient arrived on unit. Hooked up to monitor. Pt stuttering but oriented to person, place and situation. Disoriented to year. States he still feels better. Sitter at bedside for suicide precautions. 1400: Pt voided in urinal and tech accidentally dumped sample. Will collect next sample. 1629: Spoke with Ana from poison control. Updated physician. Bedside shift change report given to Stewart Potter RN (oncoming nurse) by Jose Antonio Reynolds RN (offgoing nurse). Report included the following information SBAR, Kardex, ED Summary, Intake/Output, MAR and Recent Results.

## 2020-05-14 NOTE — ROUTINE PROCESS
TRANSFER - OUT REPORT: 
 
Verbal report given to Viviane Boast RN(name) on [employer]  being transferred to CCU 22(unit) for routine progression of care Report consisted of patients Situation, Background, Assessment and  
Recommendations(SBAR). Information from the following report(s) SBAR, Kardex, ED Summary, [facility] and Recent Results was reviewed with the receiving nurse. Lines:  
Peripheral IV 05/14/20 Right Forearm (Active) Site Assessment Clean, dry, & intact 5/14/2020  8:37 AM  
Phlebitis Assessment 0 5/14/2020  8:37 AM  
Infiltration Assessment 0 5/14/2020  8:37 AM  
Dressing Status Clean, dry, & intact 5/14/2020  8:37 AM  
  
 
Opportunity for questions and clarification was provided. Patient transported with: 
 Monitor Registered Nurse

## 2020-05-14 NOTE — PROGRESS NOTES
IAdmission Medication Reconciliation:    Information obtained from: outpatient pharmacy [facility] @ [address] [phone]). RxQuery data available¹:  NO    Summary:     Medications added: bupropion, fluoxetine, lamotrigine  Medications deleted: cyclobenzaprine, desvenlafaxine, diazepam, zolpidem   Dose changes: clonazepam 1 mg BID (vs 1 mg PRN)    Inpatient orders were reviewed and no changes are needed. ¹RxQuery pharmacy benefit data reflects medications processed through the patient's insurance, however this data does NOT capture whether the medication is currently being taken by the patient. Allergies:  Ceclor [cefaclor]    Significant PMH/Disease States:   Past Medical History:   Diagnosis Date    Anxiety disorder     Bipolar affective disorder ([facility] 12/5/2011    Bipolar disorder ([facility]     Chronic pain     right foot and back pain    Depression     Mood disorder ([facility]     Self mutilation     hx of cutting self    Substance abuse ([facility]     Suicidal thoughts      Chief Complaint for this Admission:    Chief Complaint   Patient presents with    Drug Overdose     Prior to Admission Medications:   Prior to Admission Medications   Prescriptions Last Dose Informant Patient Reported? Taking? FLUoxetine (PROzac) 40 mg capsule   Yes Yes   Sig: Take 40 mg by mouth daily. buPROPion XL (WELLBUTRIN XL) 150 mg tablet   Yes Yes   Sig: Take 150 mg by mouth daily. clonazePAM (KlonoPIN) 1 mg tablet   Yes Yes   Sig: Take 1 mg by mouth two (2) times a day. lamoTRIgine (LaMICtal) 100 mg tablet   Yes Yes   Sig: Take 100 mg by mouth nightly. Facility-Administered Medications: None         Thank you for allowing me to participate in the care of this patient. Please contact the pharmacy () or the medication reconciliation pharmacist () with any questions. Luis Enrique Jackson, PharmDiego D., BCPS, BCPPS

## 2020-05-14 NOTE — BSMART NOTE
Consult placed for patient to be seen by [facility]. Writer went in once and patient struggling to share anything beyond him stating, \"I'm feeling confused. \" Patient unable to be assessed at this time. Patient to be medical admission thus psychiatric consult should be placed.

## 2020-05-15 LAB
ALBUMIN SERPL-MCNC: 3.4 G/DL (ref 3.5–5)
ALBUMIN/GLOB SERPL: 1.3 {RATIO} (ref 1.1–2.2)
ALP SERPL-CCNC: 46 U/L (ref 45–117)
ALT SERPL-CCNC: 15 U/L (ref 12–78)
ANION GAP SERPL CALC-SCNC: 6 MMOL/L (ref 5–15)
AST SERPL-CCNC: 11 U/L (ref 15–37)
BASOPHILS # BLD: 0.1 K/UL (ref 0–0.1)
BASOPHILS NFR BLD: 1 % (ref 0–1)
BILIRUB SERPL-MCNC: 0.6 MG/DL (ref 0.2–1)
BUN SERPL-MCNC: 9 MG/DL (ref 6–20)
BUN/CREAT SERPL: 10 (ref 12–20)
CALCIUM SERPL-MCNC: 8.4 MG/DL (ref 8.5–10.1)
CHLORIDE SERPL-SCNC: 108 MMOL/L (ref 97–108)
CO2 SERPL-SCNC: 26 MMOL/L (ref 21–32)
CREAT SERPL-MCNC: 0.91 MG/DL (ref 0.7–1.3)
DIFFERENTIAL METHOD BLD: ABNORMAL
EOSINOPHIL # BLD: 0.2 K/UL (ref 0–0.4)
EOSINOPHIL NFR BLD: 2 % (ref 0–7)
ERYTHROCYTE [DISTWIDTH] IN BLOOD BY AUTOMATED COUNT: 13.3 % (ref 11.5–14.5)
GLOBULIN SER CALC-MCNC: 2.7 G/DL (ref 2–4)
GLUCOSE SERPL-MCNC: 93 MG/DL (ref 65–100)
HCT VFR BLD AUTO: 38.3 % (ref 36.6–50.3)
HGB BLD-MCNC: 12.1 G/DL (ref 12.1–17)
IMM GRANULOCYTES # BLD AUTO: 0.1 K/UL (ref 0–0.04)
IMM GRANULOCYTES NFR BLD AUTO: 1 % (ref 0–0.5)
LYMPHOCYTES # BLD: 2.2 K/UL (ref 0.8–3.5)
LYMPHOCYTES NFR BLD: 22 % (ref 12–49)
MCH RBC QN AUTO: 29.2 PG (ref 26–34)
MCHC RBC AUTO-ENTMCNC: 31.6 G/DL (ref 30–36.5)
MCV RBC AUTO: 92.5 FL (ref 80–99)
MONOCYTES # BLD: 1.1 K/UL (ref 0–1)
MONOCYTES NFR BLD: 12 % (ref 5–13)
NEUTS SEG # BLD: 6.2 K/UL (ref 1.8–8)
NEUTS SEG NFR BLD: 64 % (ref 32–75)
NRBC # BLD: 0 K/UL (ref 0–0.01)
NRBC BLD-RTO: 0 PER 100 WBC
PLATELET # BLD AUTO: 250 K/UL (ref 150–400)
PMV BLD AUTO: 9.2 FL (ref 8.9–12.9)
POTASSIUM SERPL-SCNC: 3.5 MMOL/L (ref 3.5–5.1)
PROT SERPL-MCNC: 6.1 G/DL (ref 6.4–8.2)
RBC # BLD AUTO: 4.14 M/UL (ref 4.1–5.7)
SODIUM SERPL-SCNC: 140 MMOL/L (ref 136–145)
WBC # BLD AUTO: 9.8 K/UL (ref 4.1–11.1)

## 2020-05-15 PROCEDURE — 74011250637 HC RX REV CODE- 250/637: Performed by: NURSE PRACTITIONER

## 2020-05-15 PROCEDURE — 85025 COMPLETE CBC W/AUTO DIFF WBC: CPT

## 2020-05-15 PROCEDURE — 65660000000 HC RM CCU STEPDOWN

## 2020-05-15 PROCEDURE — 36415 COLL VENOUS BLD VENIPUNCTURE: CPT

## 2020-05-15 PROCEDURE — 74011250636 HC RX REV CODE- 250/636: Performed by: INTERNAL MEDICINE

## 2020-05-15 PROCEDURE — 80053 COMPREHEN METABOLIC PANEL: CPT

## 2020-05-15 PROCEDURE — 74011250637 HC RX REV CODE- 250/637: Performed by: INTERNAL MEDICINE

## 2020-05-15 RX ADMIN — LORAZEPAM 2 MG: 2 INJECTION INTRAMUSCULAR; INTRAVENOUS at 21:23

## 2020-05-15 RX ADMIN — Medication 10 ML: at 14:30

## 2020-05-15 RX ADMIN — LORAZEPAM 2 MG: 2 INJECTION INTRAMUSCULAR; INTRAVENOUS at 08:15

## 2020-05-15 RX ADMIN — Medication 10 ML: at 21:24

## 2020-05-15 RX ADMIN — LORAZEPAM 2 MG: 2 INJECTION INTRAMUSCULAR; INTRAVENOUS at 17:52

## 2020-05-15 RX ADMIN — MELATONIN 3 MG: at 21:23

## 2020-05-15 RX ADMIN — MULTIPLE VITAMINS W/ MINERALS TAB 1 TABLET: TAB at 08:15

## 2020-05-15 RX ADMIN — Medication 10 ML: at 04:06

## 2020-05-15 RX ADMIN — LORAZEPAM 2 MG: 2 INJECTION INTRAMUSCULAR; INTRAVENOUS at 04:07

## 2020-05-15 RX ADMIN — SODIUM CHLORIDE 125 ML/HR: 900 INJECTION, SOLUTION INTRAVENOUS at 04:02

## 2020-05-15 NOTE — PROGRESS NOTES
Spiritual Care Assessment/Progress Note  [facility]      NAME: Jose Reza      MRN: [id number]  AGE: 27 y.o.  SEX: male  Worship Affiliation: [employer]   Language: English     5/15/2020     Total Time (in minutes): 16     Spiritual Assessment begun in [facility] CORONARY CARE through conversation with:         [x]Patient        [] Family    [] Friend(s)        Reason for Consult: Initial/Spiritual assessment, critical care     Spiritual beliefs: (Please include comment if needed)     [x] Identifies with a zhane tradition:    Emmett Reyes      [] Supported by a zhane community:            [] Claims no spiritual orientation:           [] Seeking spiritual identity:                [] Adheres to an individual form of spirituality:           [] Not able to assess:                           Identified resources for coping:      [x] Prayer                               [] Music                  [] Guided Imagery     [x] Family/friends                 [] Pet visits     [] Devotional reading                         [] Unknown     [] Other:                                              Interventions offered during this visit: (See comments for more details)    Patient Interventions: Affirmation of emotions/emotional suffering, Affirmation of zhane, Catharsis/review of pertinent events in supportive environment, Iconic (affirming the presence of God/Higher Power), Initial/Spiritual assessment, Critical care, Normalization of emotional/spiritual concerns, Prayer (actual), Prayer (assurance of)           Plan of Care:     [x] Support spiritual and/or cultural needs    [] Support AMD and/or advance care planning process      [] Support grieving process   [] Coordinate Rites and/or Rituals    [] Coordination with community clergy   [] No spiritual needs identified at this time   [] Detailed Plan of Care below (See Comments)  [] Make referral to Music Therapy  [] Make referral to Pet Therapy     [] Make referral to Addiction services  [] Make referral to [facility]  [] Make referral to Spiritual Care Partner  [] No future visits requested        [] Follow up visits as needed     Comments: Initial visit with patient. Pastoral presence and comfort. Pt recently lost his job, but said he was not feeling undue stress due to job loss. We spoke of compilation of things and how patient dealt with things. Merced Brown expressed more concern and anxiety about dealing with the aftermath. Spoke of ways to cope and to be gentle with himself - spoke of uselessness of feeling shame. Assured Shin of pastoral care support. Pt is loosely affiliated with [employer] - shared of  God's love. Spiritual care is available to follow as needed. He identifies his mother as a resource for himself. [phone]. Visit by: Hayley Patel.  Jasmin Hamilton D.Min, MA, [facility]    Lead  Profession Development & Advancement

## 2020-05-15 NOTE — PROGRESS NOTES
0730: Bedside shift change report given to RUFUS Carranza (oncoming nurse) by Carlton Liu RN (offgoing nurse). Report included the following information SBAR, Kardex, ED Summary, Procedure Summary, Intake/Output, MAR, Recent Results and Cardiac Rhythm NSR.    0815: CIWA 9, Ativan administered. 0830: Dr Amos Gilliam, hospitalist, assessing pt at the bedside. 0900: Pt's No KLEIN, called and received pt update. 1000: Pt's mother called and received pt update. 1115: Shahriar Sin, spiritual care, speaking with pt at the bedside. 1200: Chloe MONSIVAIS, psychiatry, assessing pt at the bedside. 1210: RN and PCT/sitter assisted pt up to the [facility] with minimal assistance. 1430: Chloe NP placed orders to transfer pt to behavioral health unit once pt is medically cleared. 1435: Dr Amos Gilliam paged and MD to place discharge orders. 1530: Poison Control called RN and closed the pt's case. 1615: 7W bed board notified this RN that most likely a bed will not be available for this pt until tomorrow afternoon. 1630: Pt received remote telemetry bed assignment. 1650: TRANSFER - OUT REPORT:    Verbal report given to RUFUS Martinez(name) on [employer]  being transferred to [facility](unit) for routine progression of care       Report consisted of patients Situation, Background, Assessment and   Recommendations(SBAR). Information from the following report(s) SBAR, Kardex, ED Summary, Intake/Output, MAR, Recent Results and Cardiac Rhythm NSR was reviewed with the receiving nurse. Lines:   Peripheral IV 05/14/20 Right Forearm (Active)   Phlebitis Assessment 0 5/15/2020 12:00 PM   Infiltration Assessment 0 5/15/2020 12:00 PM   Dressing Status Clean, dry, & intact 5/15/2020 12:00 PM   Dressing Type Transparent 5/15/2020 12:00 PM   Hub Color/Line Status Green; Infusing 5/15/2020 12:00 PM   Action Taken Open ports on tubing capped 5/15/2020 12:00 PM   Alcohol Cap Used Yes 5/15/2020 12:00 PM        Opportunity for questions and clarification was provided.       Patient transported with:   Monitor  Registered Nurse  Tech

## 2020-05-15 NOTE — PROGRESS NOTES
2000 Received report from [facility] and assumed care. VSS, denies pain. CIWA 14, ativan given. Sitter at bedside. Call bell at side. 2110 CIWA 18, ativan given. Konrad Childs, NP and melatonin ordered per pt request for assistance sleeping. 2130 Poison control called and update provided. 2200 Talking to mom on phone. 0000 Resting quietly, easily awakened. VSS, denies pain, assessment unchanged. Sitter at bedside. 0400 CIWA 10, ativan given. Labs drawn. 0730 Bedside and Verbal shift change report given to Tere (oncoming nurse) by Mony Rashid (offgoing nurse). Report included the following information SBAR, Kardex, Intake/Output, MAR, Recent Results and Alarm Parameters .

## 2020-05-15 NOTE — PROGRESS NOTES
Hospitalist Progress Note  Jigar Brandon MD  Answering service: [phone] OR [phone] from in house phone        Date of Service:  5/15/2020  NAME:  Julian Vegas  :  1985  MRN:  [id number]      Admission Summary:   As per initial admission summary  Pt is a 27 y.o. M with PMH of anxiety and bipolar disorder and depression here with c/o overdose around 9:30 pm.  He took twenty-nine 150 mg wellbutrin tablets, twenty nine 40 mg prozac tablets, and thirty 100 mg lamictal tablets with alcohol.   He text his girlfriend who called Gideon Elroy ems arrival to the seen they found 3 empty bottles. Pt says prior to last night he had only taken 1 tablet from the prozac and lamictal bottles thus it is assumed 29 tablets were present prior to overdose. He denies drug use.  EMS also state pt had written on the wall DNR.  Pt has had vomiting since the incident and says he did notice pill fragments initially.  No other complaints at this time.  Hx limited as pt keeps asking if he is dead or if this is really happening and continues to repeat himself.     On my HP. Patient reported that he took multiple medications of Wellbutrin, Prozac, and Lamictal. Patient appears to be confused. But was able to answer my questions. Patient did not report any fever, any SOB, any other associated problems. Admitted to [facility]. I also talked ot ICU/CCU attending and updated them that we may need to contact you in case patient deteriorates. Interval history / Subjective:     Patient seen for Follow up of drug overdose     Patient seen and examined by the bedside, Labs, images and notes reviewed  Patient is comfortable, denies any chest pain, SOB, abdominal pain, fevers, chills. No N/V/D  Discussed with nursing staff, no acute issues overnight, orders reviewed. Looks more alert and oriented  Still has some tremors. Transferred to psych likely tomorrow.  No bed available today Assessment & Plan:     1. Overdose  Poison control was contacted by ED  Recommended to continue with lorazepam   Patient admitted to [facility]   Will closely monitor  If he develops any severe signs of serotonin syndrome likely transfer to ICU  At this point patient is clinically stable, not tachycardiac or tachypneaic, normal temperaure   Psychiatry consulted   Will transfer to psych likely tomorrow      # prolonged QTc  Likely due ot overdose  Monitor      # confusion, resolved   Due ot overdose  Encephalopathy due to medications or drugs ,   Wellbutrin, prozac tablets and lamicta  On IV fluids  Likely will improve with time      # h/o bipolar disorder  Psychiatry consulted      #/ ho alcohol intake    Code status: full code   DVT prophylaxis: [address] discussed with: Patient/Family  Disposition: Home      Hospital Problems  Date Reviewed: 12/6/2011          Codes Class Noted POA    Overdose ICD-10-CM: T50.901A  ICD-9-CM: 977.9, E980.5  5/14/2020 Unknown                Review of Systems:   A comprehensive review of systems was negative except for that written in the HPI. Vital Signs:    Last 24hrs VS reviewed since prior progress note. Most recent are:  Visit Vitals  BP (!) 112/93 (BP 1 Location: Right arm, BP Patient Position: At rest)   Pulse 85   Temp 98.5 °F (36.9 °C)   Resp 15   Wt 65 kg (143 lb 4.8 oz)   SpO2 95%   BMI 19.43 kg/m²         Intake/Output Summary (Last 24 hours) at 5/15/2020 1541  Last data filed at 5/15/2020 1200  Gross per 24 hour   Intake 3068.75 ml   Output 900 ml   Net 2168.75 ml        Physical Examination:             Constitutional:  No acute distress, cooperative, pleasant    ENT:  Oral mucous moist, oropharynx benign. Neck supple,    Resp:  CTA bilaterally. No wheezing/rhonchi/rales. No accessory muscle use   CV:  Regular rhythm, normal rate, no murmurs, gallops, rubs    GI:  Soft, non distended, non tender.  normoactive bowel sounds, no hepatosplenomegaly Musculoskeletal:  No edema, warm, 2+ pulses throughout    Neurologic:  Moves all extremities. AAOx3, CN II-XII reviewed     Psych:  Good insight, Not anxious nor agitated. Skin:  Good turgor, no rashes or ulcers  Hematologic/Lymphatic/Immunlogic:  No jaundice nor lymph node swelling  Eyes:  EOMI. Anicteric sclerae, PERRL. Data Review:    Review and/or order of clinical lab test  Review and/or order of tests in the radiology section of [facility]      Labs:     Recent Labs     05/15/20  0401 05/14/20  0833   WBC 9.8 9.3   HGB 12.1 15.2   HCT 38.3 47.9    354     Recent Labs     05/15/20  0401 05/14/20  0833    137   K 3.5 4.2    106   CO2 26 20*   BUN 9 10   CREA 0.91 1.04   GLU 93 101*   CA 8.4* 9.3   MG  --  2.5*   PHOS  --  3.2     Recent Labs     05/15/20  0401 05/14/20  0833   SGOT 11* 14*   ALT 15 21   AP 46 58   TBILI 0.6 0.2   TP 6.1* 8.2   ALB 3.4* 4.5   GLOB 2.7 3.7     No results for input(s): INR, PTP, APTT, INREXT in the last 72 hours. No results for input(s): FE, TIBC, PSAT, FERR in the last 72 hours. No results found for: FOL, RBCF   No results for input(s): PH, PCO2, PO2 in the last 72 hours. No results for input(s): CPK, CKNDX, TROIQ in the last 72 hours.     No lab exists for component: CPKMB  No results found for: CHOL, CHOLX, CHLST, CHOLV, HDL, HDLP, LDL, LDLC, DLDLP, TGLX, TRIGL, TRIGP, CHHD, CHHDX  No results found for: [facility]  Lab Results   Component Value Date/Time    Color YELLOW/STRAW 05/14/2020 08:11 PM    Appearance CLEAR 05/14/2020 08:11 PM    Specific gravity 1.027 05/14/2020 08:11 PM    Specific gravity 1.020 10/29/2010 02:55 AM    pH (UA) 5.5 05/14/2020 08:11 PM    Protein Negative 05/14/2020 08:11 PM    Glucose Negative 05/14/2020 08:11 PM    Ketone 15 (A) 05/14/2020 08:11 PM    Bilirubin Negative 05/14/2020 08:11 PM    Urobilinogen 0.2 05/14/2020 08:11 PM    Nitrites Negative 05/14/2020 08:11 PM    Leukocyte Esterase Negative 05/14/2020 08:11 PM    Epithelial cells FEW 12/20/2015 05:03 AM    Bacteria NEGATIVE  12/20/2015 05:03 AM    WBC 0-4 12/20/2015 05:03 AM    RBC 0-5 12/20/2015 05:03 AM         Medications Reviewed:     Current Facility-Administered Medications   Medication Dose Route Frequency    sodium chloride (NS) flush 5-40 mL  5-40 mL IntraVENous Q8H    sodium chloride (NS) flush 5-40 mL  5-40 mL IntraVENous PRN    LORazepam (ATIVAN) injection 2 mg  2 mg IntraVENous Q1H PRN    multivitamin, tx-iron-ca-min (THERA-M w/ IRON) tablet 1 Tab  1 Tab Oral DAILY    nicotine (NICODERM CQ) 14 mg/24 hr patch 1 Patch  1 Patch TransDERmal Q24H    melatonin tablet 3 mg  3 mg Oral QHS PRN     ______________________________________________________________________  EXPECTED LENGTH OF STAY: 5d 19h  ACTUAL LENGTH OF STAY:          Bari Shepherd MD

## 2020-05-15 NOTE — CONSULTS
Damir Peterson    Name:  Abdirizak Hardin  MR#:  [id number]  :  1985  ACCOUNT #:  [de-identified]  DATE OF SERVICE:  05/15/2020    BEHAVIORAL HEALTH CONSULTATION    CHIEF COMPLAINT:  \"I felt depressed. \"    HISTORY OF PRESENT ILLNESS:  The patient is a 69-year-old male who is currently being seen at [facility] for psychiatric consultation after he overdosed on his medications. He is currently seeing Ana Luisa Mcpherson, a PMHNP at [facility]. He carries a diagnosis of bipolar I disorder and was prescribed Wellbutrin, Prozac and lamotrigine. He states that he had a pretty good day before the incident, but then all of a sudden, he felt \"deep depression. \"  He then ended up taking his recently filled medications. He took 29 tablets Wellbutrin 150 mg, 29 tablets of the Prozac 40 mg, and 30 tablets of the Lamictal 100 mg with alcohol. His BAL on admission was 96, and he was positive for amphetamines. He is aware that he has a drinking problem. He drinks about everyday, about 5 or more drinks. He states that his most recent manic episode was about 3 months ago. He states that right after he overdose, he texted his girlfriend who called 911. EMS found 3 empty bottles right next to him. EMS also reported on admission that he had written on the wall DNR. 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 He appeared very depressed during the interview. He is amenable to voluntary admission at [facility]. He currently denies suicidal ideation, homicidal ideation, auditory or visual hallucination. PAST MEDICAL HISTORY:  See H and P. PAST PSYCHIATRIC HISTORY:  He states that he has been admitted to different psychiatric facilities such as [facility] and [facility].  He carries a diagnosis of bipolar I disorder.   He is currently seeing Gunner Carbajal, a PMHNP at [facility]. PSYCHOSOCIAL HISTORY:  He is single. He has no children. He lives with 2 roommates. MENTAL STATUS EXAMINATION:  He is alert and oriented in all spheres. He is dressed in hospital apparel. He reports his mood is okay. Affect is blunted. Speech normal rate and rhythm. Thought process:  Logical and goal directed. He currently denies suicidal ideation or homicidal ideation. Memory seems intact. Intelligence is average. Insight is partial.  Judgment is poor. ASSESSMENT AND PLAN:  The patient has been diagnosed with bipolar I disorder, current episode depressed, without psychotic features. Please continue holding psychotropic medications. He is amenable to voluntary admission to [facility]. Please have him transferred once he is medically stable. Continue sitter until he gets transferred. Thank you for this consult. Please call with questions.         TAMI FRIEND NP      [id number]  D:  05/15/2020 14:15  T:  05/15/2020 17:00  JOB #:  [id number]

## 2020-05-15 NOTE — CDMP QUERY
Pt admitted with Overdose, Confusion documented. If possible, please document in the progress note and D/C Summary if you are evaluating and/or treating any of the following:  Alcoholic Encephalopathy  Metabolic Encephalopathy  Septic Encephalopathy  Toxic Encephalopathy  Encephalopathy due to medications or drugs (please specify)  Toxic Metabolic Encephalopathy  Other Encephalopathy  Other, please specify  Clinically unable to determine The medical record reflects the following: 
   Risk Factors: 26 y/o male admitted for OD, with hx of Bipolar d/o Clinical Indicators: Confusion, total 88 tabs of Lamictal, Prozac, and Wellbutrin. Prolong QTc, Alcohol level 96, Tox screen +Amphetamines Treatment: IVF bolus 1L then 125mml/hr, Ativan IV, on Suicide Precautions Thank you, Christianne Vanegas, RN [address] [place] [address] [phone]

## 2020-05-15 NOTE — INTERDISCIPLINARY ROUNDS
IDR/SLIDR Summary Patient: Marcio Blair MRN: [id number]    Age: 27 y.o. YOB: 1985 Room/Bed: [address] [place] [address] Admit Diagnosis: Overdose [T50.901A]  Principal Diagnosis: <principal problem not specified>  
Goals: psych consult, stable VS 
Readmission: YES  Quality Measure: CIWA 
VTE Prophylaxis: Mechanical 
Influenza Vaccine screening completed? YES Pneumococcal Vaccine screening completed? YES Mobility needs: Yes   Nutrition plan:Yes 
Consults:P.T, O.T. and Case Management Financial concerns:Yes  Escalated to CM? YES 
RRAT Score: 8  Interventions:H2H Testing due for pt today? YES 
LOS: 0 days Expected length of stay ? days Discharge plan: home   PCP: None Transportation needs: Yes Days before discharge:two or more days before discharge Discharge disposition: Home Signed:  
 
Michelle Knight RN 
5/14/2020 
8:40 PM

## 2020-05-15 NOTE — DISCHARGE INSTRUCTIONS
Discharge Instructions       PATIENT ID: Mely Darden  MRN: [id number]   YOB: 1985    DATE OF ADMISSION: 5/14/2020  8:25 AM    DATE OF DISCHARGE: 5/15/2020    PRIMARY CARE PROVIDER: None     ATTENDING PHYSICIAN: Pepito Beavers MD  DISCHARGING PROVIDER: Daren Almazan MD    To contact this individual call [phone] and ask the  to page. If unavailable ask to be transferred the Adult Hospitalist Department. DISCHARGE DIAGNOSES ***    CONSULTATIONS: IP CONSULT TO HOSPITALIST  IP CONSULT TO PSYCHIATRY    PROCEDURES/SURGERIES: * No surgery found *      FOLLOW UP APPOINTMENTS:   Follow-up Information    None      will be transferred to psych unit     ADDITIONAL CARE RECOMMENDATIONS:   Follow up with psych after discharge from psych unit     DIET: Regular Diet    ACTIVITY: Activity as tolerated      DISCHARGE MEDICATIONS:   See Medication Reconciliation Form    · It is important that you take the medication exactly as they are prescribed. · Keep your medication in the bottles provided by the pharmacist and keep a list of the medication names, dosages, and times to be taken in your wallet. · Do not take other medications without consulting your doctor. NOTIFY YOUR PHYSICIAN FOR ANY OF THE FOLLOWING:   Fever over 101 degrees for 24 hours. Chest pain, shortness of breath, fever, chills, nausea, vomiting, diarrhea, change in mentation, falling, weakness, bleeding. Severe pain or pain not relieved by medications. Or, any other signs or symptoms that you may have questions about. DISPOSITION:    Home With:   OT  PT  HH  RN       SNF/Inpatient Rehab/LTAC    Independent/assisted living    Hospice    Other:     CDMP Checked:   Yes ***     PROBLEM LIST Updated:  Yes ***       Information obtained by :   I understand that if any problems occur once I am at home I am to contact my physician. I understand and acknowledge receipt of the instructions indicated above. Physician's or R.N.'s Signature                                                                  Date/Time                                                                                                                                              Patient or Representative Signature                                                          Date/Time          Signed:   Serena Arenas MD  5/15/2020  2:55 PM

## 2020-05-15 NOTE — CONSULTS
1. Transfer to [facility] once medically cleared. 2. Continue sitter until he gets transferred. 3. Hold psychotropic meds. Full consult dictated. Thank you for this kind consult.

## 2020-05-16 ENCOUNTER — HOSPITAL ENCOUNTER (INPATIENT)
Age: 35
LOS: 6 days | Discharge: HOME | DRG: 885 | End: 2020-05-22
Attending: PSYCHIATRY & NEUROLOGY | Admitting: PSYCHIATRY & NEUROLOGY
Payer: COMMERCIAL

## 2020-05-16 VITALS
TEMPERATURE: 98.3 F | SYSTOLIC BLOOD PRESSURE: 126 MMHG | OXYGEN SATURATION: 97 % | RESPIRATION RATE: 16 BRPM | HEART RATE: 91 BPM | BODY MASS INDEX: 19.43 KG/M2 | DIASTOLIC BLOOD PRESSURE: 80 MMHG | WEIGHT: 143.3 LBS

## 2020-05-16 PROBLEM — F31.9 BIPOLAR DISORDER (HCC): Status: ACTIVE | Noted: 2020-05-16

## 2020-05-16 LAB
ALBUMIN SERPL-MCNC: 3.4 G/DL (ref 3.5–5)
ALBUMIN/GLOB SERPL: 1 {RATIO} (ref 1.1–2.2)
ALP SERPL-CCNC: 49 U/L (ref 45–117)
ALT SERPL-CCNC: 16 U/L (ref 12–78)
ANION GAP SERPL CALC-SCNC: 8 MMOL/L (ref 5–15)
AST SERPL-CCNC: 14 U/L (ref 15–37)
BASOPHILS # BLD: 0.1 K/UL (ref 0–0.1)
BASOPHILS NFR BLD: 1 % (ref 0–1)
BILIRUB SERPL-MCNC: 0.6 MG/DL (ref 0.2–1)
BUN SERPL-MCNC: 6 MG/DL (ref 6–20)
BUN/CREAT SERPL: 7 (ref 12–20)
CALCIUM SERPL-MCNC: 8.8 MG/DL (ref 8.5–10.1)
CHLORIDE SERPL-SCNC: 105 MMOL/L (ref 97–108)
CO2 SERPL-SCNC: 25 MMOL/L (ref 21–32)
CREAT SERPL-MCNC: 0.82 MG/DL (ref 0.7–1.3)
DIFFERENTIAL METHOD BLD: NORMAL
EOSINOPHIL # BLD: 0.2 K/UL (ref 0–0.4)
EOSINOPHIL NFR BLD: 2 % (ref 0–7)
ERYTHROCYTE [DISTWIDTH] IN BLOOD BY AUTOMATED COUNT: 12.8 % (ref 11.5–14.5)
GLOBULIN SER CALC-MCNC: 3.4 G/DL (ref 2–4)
GLUCOSE SERPL-MCNC: 113 MG/DL (ref 65–100)
HCT VFR BLD AUTO: 38.7 % (ref 36.6–50.3)
HGB BLD-MCNC: 12.9 G/DL (ref 12.1–17)
IMM GRANULOCYTES # BLD AUTO: 0 K/UL (ref 0–0.04)
IMM GRANULOCYTES NFR BLD AUTO: 0 % (ref 0–0.5)
LYMPHOCYTES # BLD: 2.2 K/UL (ref 0.8–3.5)
LYMPHOCYTES NFR BLD: 28 % (ref 12–49)
MCH RBC QN AUTO: 30.4 PG (ref 26–34)
MCHC RBC AUTO-ENTMCNC: 33.3 G/DL (ref 30–36.5)
MCV RBC AUTO: 91.3 FL (ref 80–99)
MONOCYTES # BLD: 0.8 K/UL (ref 0–1)
MONOCYTES NFR BLD: 11 % (ref 5–13)
NEUTS SEG # BLD: 4.4 K/UL (ref 1.8–8)
NEUTS SEG NFR BLD: 58 % (ref 32–75)
NRBC # BLD: 0 K/UL (ref 0–0.01)
NRBC BLD-RTO: 0 PER 100 WBC
PLATELET # BLD AUTO: 260 K/UL (ref 150–400)
PMV BLD AUTO: 9.4 FL (ref 8.9–12.9)
POTASSIUM SERPL-SCNC: 3.5 MMOL/L (ref 3.5–5.1)
PROT SERPL-MCNC: 6.8 G/DL (ref 6.4–8.2)
RBC # BLD AUTO: 4.24 M/UL (ref 4.1–5.7)
SODIUM SERPL-SCNC: 138 MMOL/L (ref 136–145)
WBC # BLD AUTO: 7.6 K/UL (ref 4.1–11.1)

## 2020-05-16 PROCEDURE — 65220000003 HC RM SEMIPRIVATE PSYCH

## 2020-05-16 PROCEDURE — 85025 COMPLETE CBC W/AUTO DIFF WBC: CPT

## 2020-05-16 PROCEDURE — 80053 COMPREHEN METABOLIC PANEL: CPT

## 2020-05-16 PROCEDURE — 74011250636 HC RX REV CODE- 250/636: Performed by: INTERNAL MEDICINE

## 2020-05-16 PROCEDURE — 93005 ELECTROCARDIOGRAM TRACING: CPT

## 2020-05-16 PROCEDURE — 74011250637 HC RX REV CODE- 250/637: Performed by: INTERNAL MEDICINE

## 2020-05-16 PROCEDURE — 74011250637 HC RX REV CODE- 250/637: Performed by: NURSE PRACTITIONER

## 2020-05-16 PROCEDURE — 36415 COLL VENOUS BLD VENIPUNCTURE: CPT

## 2020-05-16 RX ORDER — TRAZODONE HYDROCHLORIDE 50 MG/1
50 TABLET ORAL
Status: DISCONTINUED | OUTPATIENT
Start: 2020-05-16 | End: 2020-05-18

## 2020-05-16 RX ORDER — OLANZAPINE 5 MG/1
5 TABLET ORAL
Status: DISCONTINUED | OUTPATIENT
Start: 2020-05-16 | End: 2020-05-22 | Stop reason: HOSPADM

## 2020-05-16 RX ORDER — IBUPROFEN 200 MG
1 TABLET ORAL DAILY
Status: DISCONTINUED | OUTPATIENT
Start: 2020-05-16 | End: 2020-05-16 | Stop reason: HOSPADM

## 2020-05-16 RX ORDER — HALOPERIDOL 5 MG/ML
5 INJECTION INTRAMUSCULAR
Status: DISCONTINUED | OUTPATIENT
Start: 2020-05-16 | End: 2020-05-22 | Stop reason: HOSPADM

## 2020-05-16 RX ORDER — BENZTROPINE MESYLATE 1 MG/1
1 TABLET ORAL
Status: DISCONTINUED | OUTPATIENT
Start: 2020-05-16 | End: 2020-05-22 | Stop reason: HOSPADM

## 2020-05-16 RX ORDER — DIPHENHYDRAMINE HYDROCHLORIDE 50 MG/ML
50 INJECTION, SOLUTION INTRAMUSCULAR; INTRAVENOUS
Status: DISCONTINUED | OUTPATIENT
Start: 2020-05-16 | End: 2020-05-22 | Stop reason: HOSPADM

## 2020-05-16 RX ORDER — ACETAMINOPHEN 325 MG/1
650 TABLET ORAL
Status: DISCONTINUED | OUTPATIENT
Start: 2020-05-16 | End: 2020-05-22 | Stop reason: HOSPADM

## 2020-05-16 RX ORDER — LORAZEPAM 2 MG/ML
1 INJECTION INTRAMUSCULAR
Status: DISCONTINUED | OUTPATIENT
Start: 2020-05-16 | End: 2020-05-22 | Stop reason: HOSPADM

## 2020-05-16 RX ORDER — LORAZEPAM 0.5 MG/1
1 TABLET ORAL
Status: DISCONTINUED | OUTPATIENT
Start: 2020-05-16 | End: 2020-05-16 | Stop reason: HOSPADM

## 2020-05-16 RX ORDER — HYDROXYZINE 50 MG/1
50 TABLET, FILM COATED ORAL
Status: DISCONTINUED | OUTPATIENT
Start: 2020-05-16 | End: 2020-05-18

## 2020-05-16 RX ORDER — ADHESIVE BANDAGE
30 BANDAGE TOPICAL DAILY PRN
Status: DISCONTINUED | OUTPATIENT
Start: 2020-05-16 | End: 2020-05-22 | Stop reason: HOSPADM

## 2020-05-16 RX ORDER — PHENOBARBITAL 64.8 MG/1
64.8 TABLET ORAL 3 TIMES DAILY
Status: DISCONTINUED | OUTPATIENT
Start: 2020-05-16 | End: 2020-05-17

## 2020-05-16 RX ADMIN — LORAZEPAM 2 MG: 2 INJECTION INTRAMUSCULAR; INTRAVENOUS at 04:12

## 2020-05-16 RX ADMIN — LORAZEPAM 2 MG: 2 INJECTION INTRAMUSCULAR; INTRAVENOUS at 08:14

## 2020-05-16 RX ADMIN — PHENOBARBITAL 64.8 MG: 64.8 TABLET ORAL at 16:34

## 2020-05-16 RX ADMIN — LORAZEPAM 1 MG: 0.5 TABLET ORAL at 11:12

## 2020-05-16 RX ADMIN — TRAZODONE HYDROCHLORIDE 50 MG: 50 TABLET ORAL at 21:18

## 2020-05-16 RX ADMIN — MULTIPLE VITAMINS W/ MINERALS TAB 1 TABLET: TAB at 08:14

## 2020-05-16 NOTE — PROGRESS NOTES
Bedside shift change report given to Sheila Dumont RN (oncoming nurse) by Lestine Cushing, RN (offgoing nurse). Report included the following information SBAR, Kardex, Intake/Output, MAR and Recent Results.

## 2020-05-16 NOTE — ROUTINE PROCESS
Bedside shift change report given to [address] [place] [address] (oncoming nurse) by Mariah Mon RN (offgoing nurse). Report included the following information SBAR, Kardex, Intake/Output and MAR.

## 2020-05-16 NOTE — BH NOTES
Pt admitted voluntarily from [address] [place] [address] signs taken and skin assessment completed by Keely Espinoza and Pablo Lima RN. Valuables reviewed and placed in closet on the unit. Placed on Q 15 minute safety checks.

## 2020-05-16 NOTE — GROUP NOTE
IP  GROUP DOCUMENTATION INDIVIDUAL                                                                          Group Therapy Note    Date: 5/16/2020    Group Start Time: 0830  Group End Time: 1915  Group Topic: Nursing    [facility] 7W GEN BEHAV Marisol Mcfarland    IP [address] GROUP DOCUMENTATION GROUP    Group Therapy Note    Attendees: Mansoor Maynard, and José Antonio. The only one who did not attend was Irina. Attendance: Attended    Patient's Goal:  learning    Interventions/techniques: Informed    Follows Directions:  Followed directions    Interactions: Interacted appropriately    Mental Status: Calm    Behavior/appearance: Attentive    Goals Achieved: Able to listen to others      Additional Notes:  none    Nay Dural

## 2020-05-16 NOTE — PROGRESS NOTES
Problem: Depressed Mood (Adult/Pediatric)  Goal: *STG: Complies with medication therapy  Outcome: Progressing Towards Goal     Problem: Depressed Mood (Adult/Pediatric)  Goal: *STG: Demonstrates reduction in symptoms and increase in insight into coping skills/future focused  Outcome: Not Progressing Towards Goal

## 2020-05-16 NOTE — PROGRESS NOTES
RN informed Pt's family left bag of belongings downstairs for Pt, due to safety concerns Pt is not allowed to have bag brought upstairs. RN communicated this to Pt who called his mother to ask her to  the bag.

## 2020-05-16 NOTE — DISCHARGE SUMMARY
Discharge Summary       PATIENT ID: Wendy Chi  MRN: [id number]   YOB: 1985    DATE OF ADMISSION: 5/14/2020  8:25 AM    DATE OF DISCHARGE: 5/16/2020   PRIMARY CARE PROVIDER: None     ATTENDING PHYSICIAN: Dr. Jean Medeiros  DISCHARGING PROVIDER: Ana Muhammad NP    To contact this individual call [phone] and ask the  to page. If unavailable ask to be transferred the Adult Hospitalist Department. CONSULTATIONS: IP CONSULT TO HOSPITALIST  IP CONSULT TO PSYCHIATRY    PROCEDURES/SURGERIES: * No surgery found *    ADMITTING [address] [place] [address] COURSE:   Mr. Solange Flores is a 28 yo male admitted on 5/14 via EMS for intentional overdose of medications after bout of severe depression and attempt to kill himself. Per admission documentation, significant doses of Wellbutrin, Prozac, and Lamictal were taken with alcohol in addition to testing positive for amphetamines. He has been feeling incredibly overwhelmed lately and has had several admissions to [address] [place] [address] units in the past. He was seen by Psychiatry on 5/15 who recommended 24/7 sitter and admission to inpatient U for further management. On day of discharge, Mr. Solange Flores is medically cleared for transfer to [address] [place] [address]. He has had no signs of alcohol withdrawal and his encephalopathy has resolved. Have ordered one additional ECG to ensure QTC back to normal and will follow that up upon transfer. Per his remote tele, it appears resolved. Discussed with [address] [place] [address] Bed Board whom report a bed will be available today.      PENDING TEST RESULTS:   At the time of discharge the following test results are still pending: ECG    FOLLOW UP APPOINTMENTS:    Follow-up Information     Follow up With Specialties Details Why Contact Info    None    None (395) Patient stated that they have no PCP               DIET: Regular Diet    ACTIVITY: Activity as tolerated      DISCHARGE MEDICATIONS:  Current Discharge Medication List            NOTIFY [address] [facility] FOLLOWING:   Fever over 101 degrees for 24 hours. Chest pain, shortness of breath, fever, chills, nausea, vomiting, diarrhea, change in mentation, falling, weakness, bleeding. Severe pain or pain not relieved by medications. Or, any other signs or symptoms that you may have questions about. DISPOSITION:  x  Home With:   OT  PT  HH  RN       Long term SNF/Inpatient Rehab    Independent/assisted living    Hospice    Other:       PATIENT CONDITION AT DISCHARGE:     Functional status    Poor     Deconditioned    x Independent      Cognition   x  Lucid     Forgetful     Dementia      Catheters/lines (plus indication)    West     PICC     PEG    x None      Code status   x  Full code     DNR      PHYSICAL EXAMINATION AT DISCHARGE:  General:          Alert, cooperative, no distress, appears stated age. HEENT:           Atraumatic, anicteric sclerae, pink conjunctivae                          No oral ulcers, mucosa moist, throat clear, dentition fair  Neck:               Supple, symmetrical  Lungs:             Clear to auscultation bilaterally. No Wheezing or Rhonchi. No rales. Chest wall:      No tenderness  No Accessory muscle use. Heart:              Regular  rhythm,  No  murmur   No edema  Abdomen:        Soft, non-tender. Not distended. Bowel sounds normal  Extremities:     No cyanosis. No clubbing,                            Skin turgor normal, Capillary refill normal  Skin:                Not pale. Not Jaundiced  No rashes   Psych:             Very anxious after discussion with him about General acute hospital unit transfer. On my entrance to room he was calm, but tearful and quickly became anxious at thought of transfer. After discussion and deep breathing he became more calm.   Neurologic:      Alert, moves all extremities, answers questions appropriately and responds to commands       CHRONIC MEDICAL DIAGNOSES:  Problem List as of 5/16/2020 Date Reviewed: 12/6/2011          Codes Class Noted - Resolved    Bipolar affective disorder [facility]) ICD-10-CM: F31.9  ICD-9-CM: 296.80 Chronic 12/5/2011 - Present        Overdose ICD-10-CM: T50.901A  ICD-9-CM: 977.9, E980.5  5/14/2020 - Present        Mood swings ICD-10-CM: R45.86  ICD-9-CM: 296.99  6/10/2011 - Present        Self mutilation ICD-9-CM: 300.9  6/10/2011 - Present              Greater than 45 minutes were spent with the patient on counseling and coordination of care    Signed:   Lindsay Isabel NP  5/16/2020  10:50 AM

## 2020-05-16 NOTE — BH NOTES
TRANSFER - IN REPORT:    Verbal report received from [place], RN on Jessica Para  being received from [address] [place] [address]) for routine progression of care      Report consisted of patients Situation, Background, Assessment and   Recommendations(SBAR). Information from the following report(s) SBAR was reviewed with the receiving nurse. Opportunity for questions and clarification was provided. Assessment completed upon patients arrival to unit and care assumed.

## 2020-05-17 LAB
ATRIAL RATE: 78 BPM
CALCULATED P AXIS, ECG09: 62 DEGREES
CALCULATED R AXIS, ECG10: 61 DEGREES
CALCULATED T AXIS, ECG11: 66 DEGREES
DIAGNOSIS, 93000: NORMAL
P-R INTERVAL, ECG05: 140 MS
Q-T INTERVAL, ECG07: 388 MS
QRS DURATION, ECG06: 84 MS
QTC CALCULATION (BEZET), ECG08: 442 MS
VENTRICULAR RATE, ECG03: 78 BPM

## 2020-05-17 PROCEDURE — 74011250637 HC RX REV CODE- 250/637: Performed by: NURSE PRACTITIONER

## 2020-05-17 PROCEDURE — 65220000003 HC RM SEMIPRIVATE PSYCH

## 2020-05-17 RX ORDER — LAMOTRIGINE 25 MG/1
25 TABLET ORAL DAILY
Status: DISCONTINUED | OUTPATIENT
Start: 2020-05-17 | End: 2020-05-18

## 2020-05-17 RX ORDER — IBUPROFEN 200 MG
1 TABLET ORAL DAILY
Status: DISCONTINUED | OUTPATIENT
Start: 2020-05-18 | End: 2020-05-22 | Stop reason: HOSPADM

## 2020-05-17 RX ORDER — PHENOBARBITAL 32.4 MG/1
32.4 TABLET ORAL 3 TIMES DAILY
Status: DISCONTINUED | OUTPATIENT
Start: 2020-05-17 | End: 2020-05-18

## 2020-05-17 RX ADMIN — LAMOTRIGINE 25 MG: 25 TABLET ORAL at 15:20

## 2020-05-17 RX ADMIN — PHENOBARBITAL 64.8 MG: 64.8 TABLET ORAL at 08:53

## 2020-05-17 RX ADMIN — TRAZODONE HYDROCHLORIDE 50 MG: 50 TABLET ORAL at 21:36

## 2020-05-17 RX ADMIN — PHENOBARBITAL 32.4 MG: 32.4 TABLET ORAL at 16:18

## 2020-05-17 RX ADMIN — MAGNESIUM HYDROXIDE 30 ML: 400 SUSPENSION ORAL at 21:35

## 2020-05-17 NOTE — GROUP NOTE
ANDRÉS  GROUP DOCUMENTATION INDIVIDUAL Group Therapy Note Date: 5/17/2020 Group Start Time: 8079 Group End Time: 1300 Group Topic: Recreational/Music Therapy [address] Sergei BOWEN  GROUP DOCUMENTATION GROUP Group Therapy Note Attendees: 6/7 Attendance: Did not attend Conchis Craven

## 2020-05-17 NOTE — PROGRESS NOTES
At 2135:  Gave Trazodone for sleep. Patient refused Phenobarb and states \"it's too much for me and I really feel fine and am not have any withdraws\". Patient educated on medications. Will continue to monitor. Problem: Depressed Mood (Adult/Pediatric)  Goal: *STG: Attends activities and groups  Outcome: Progressing Towards Goal  Goal: *STG: Complies with medication therapy  Outcome: Progressing Towards Goal     Problem: Falls - Risk of  Goal: *Absence of Falls  Description: Document Genny Fall Risk and appropriate interventions in the flowsheet.   Outcome: Progressing Towards Goal  Note: Fall Risk Interventions:            Medication Interventions: Teach patient to arise slowly, Evaluate medications/consider consulting pharmacy

## 2020-05-17 NOTE — PROGRESS NOTES
Pt appears to be sleeping, NAD, respirations WNL, will continue to monitor q15 for safety. Problem: Falls - Risk of  Goal: *Absence of Falls  Description: Document Seng Matthews Fall Risk and appropriate interventions in the flowsheet.   Outcome: Progressing Towards Goal  Note: Fall Risk Interventions:            Medication Interventions: Teach patient to arise slowly

## 2020-05-17 NOTE — GROUP NOTE
[facility] GROUP DOCUMENTATION INDIVIDUAL Group Therapy Note Date: 5/17/2020 Group Start Time: 4759 Group End Time: 0117 Group Topic: [employer] [address] Arthuro Reno [facility] GROUP DOCUMENTATION GROUP Group Therapy Note Attendees: 7/8 Attendance: Attended Patient's Goal:  Go to school for IT, be more social with friends and family, relax Interventions/techniques: Informed, Provide feedback and Supported Follows Directions: Followed directions Interactions: Interacted appropriately Mental Status: Calm Behavior/appearance: Attentive and Cooperative Goals Achieved: Able to engage in interactions, Able to listen to others, Able to reflect/comment on own behavior and Able to receive feedback Additional Notes: Staff held group to discuss treatment team and goal setting. Staff named the staff members that are involved in treatment team and explained that everyone will be back tomorrow for regular sessions. Staff addressed questions that patients had regarding the unit, treatment team, and discharging. Next, staff discussed the importance of setting small, measurable goals in order to achieve a larger goal. Staff asked patients to think a goal for themselves for today and goals for once they are discharged from the hospital. Patients were asked to share goals with one another. Pt actively engaged in conversation with staff and peers. Pt shared that he is worn out from being a  and he is hoping to start school in the fall doing IT. Staff asked patient to break this down to a smaller goal to take it day by day to work on application for school, pt agreed. Shared that he wants to be more social with friends and family but he has a bad habit of canceling plans last minute.  Staff and pt discussed writing down plans in planner and marking when he cancels so he can see pattern of how often he cancels. Jama Running

## 2020-05-17 NOTE — PROGRESS NOTES
Problem: Depressed Mood (Adult/Pediatric)  Goal: *STG: Participates in treatment plan  Outcome: Progressing Towards Goal  Goal: *STG: Verbalizes anger, guilt, and other feelings in a constructive manor  Outcome: Progressing Towards Goal  Goal: *STG: Demonstrates reduction in symptoms and increase in insight into coping skills/future focused  Outcome: Progressing Towards Goal    Patient states he is feeling \"tired\". Anxiety due to job lay-off and the difficult times. Denies SI/HI. Considers options for employment in future. Goal for day is to shower. Staff focus on verbalizing feelings, coping skills.

## 2020-05-17 NOTE — INTERDISCIPLINARY ROUNDS
Behavioral Health Interdisciplinary Rounds     Patient Name: Barbara Sorenson  Age: 27 y.o. Room/Bed:  [facility]/  Primary Diagnosis: <principal problem not specified>   Admission Status: Voluntary     Readmission within 30 days: no  Power of  in place: no  Patient requires a blocked bed: no          Reason for blocked bed:     VTE Prophylaxis: No    Mobility needs/Fall risk: no  Flu Vaccine : no   Nutritional Plan: no  Consults:          Labs/Testing due today?: no    Sleep hours: 7.5       Participation in Care/Groups:  yes  Medication Compliant?: Yes  PRNS (last 24 hours): Sleep Aid    Restraints (last 24 hours):  no     CIWA (range last 24 hours): CIWA-Ar Total: 0   COWS (range last 24 hours):      Alcohol screening (AUDIT) completed -   AUDIT Score: 16     If applicable, date SBIRT discussed in treatment team AND documented:   AUDIT Screen Score: AUDIT Score: 16      Document Brief Intervention (corresponds directly with the 5 A's, Ask, Advise, Assess, Assist, and Arrange): At- Risk Patients (Score 7-15 for women; 8-15 for men)  Discuss concern patient is drinking at unhealthy levels known to increase risk of alcohol-related health problems. Is Patient ready to commit to change? If No:   Encourage reflection   Discuss short term and long term health risks of consuming alcohol   Barriers to change   Reaffirm willingness to help / Educational materials provided  If Yes:   Set goal  [employer] provided    Harmful use or Dependence (Score 16 or greater)   Discuss short term and long term health risks of consuming alcohol   Recommendations   Negotiate drinking goal   Recommend addiction specialist/center   Arrange follow-up appointments.     Tobacco - patient is a smoker: Have You Used Tobacco in the Past 30 Days: Yes  Illegal Drugs use: Have You Used Any Illegal Substances Over the Past 12 Months: No    24 hour chart check complete: yes     Patient goal(s) for today: Treatment team focus/goals:   Progress note     LOS:  1  Expected LOS:     Financial concerns/prescription coverage:    Family contact:       Family requesting physician contact today:    Discharge plan:   Access to weapons :         Outpatient provider(s):   Patient's preferred phone number for follow up call :     Participating treatment team members: Idris Ferrer, * (assigned SW),

## 2020-05-17 NOTE — GROUP NOTE
[facility] GROUP DOCUMENTATION INDIVIDUAL                                                                          Group Therapy Note    Date: 5/17/2020    Group Start Time: 1800  Group End Time: 1900  Group Topic: Nursing    [facility] 7W GEN BEHAV Seema Jara    IP [facility] GROUP DOCUMENTATION GROUP    Group Therapy Note    Attendees: Bobby Johns, and José Antonio. Jenny yLla did not attend. Attendance: Attended    Patient's Goal:  learning    Interventions/techniques: Informed    Follows Directions:  Followed directions    Interactions: Interacted appropriately    Mental Status: Calm    Behavior/appearance: Cooperative    Goals Achieved: Able to listen to others and Able to receive feedback      Additional Notes:  none    Jayson Dash

## 2020-05-18 PROCEDURE — 74011250637 HC RX REV CODE- 250/637: Performed by: NURSE PRACTITIONER

## 2020-05-18 PROCEDURE — 65220000003 HC RM SEMIPRIVATE PSYCH

## 2020-05-18 RX ORDER — QUETIAPINE FUMARATE 25 MG/1
25 TABLET, FILM COATED ORAL
Status: DISCONTINUED | OUTPATIENT
Start: 2020-05-18 | End: 2020-05-22 | Stop reason: HOSPADM

## 2020-05-18 RX ORDER — DIVALPROEX SODIUM 500 MG/1
1000 TABLET, EXTENDED RELEASE ORAL
Status: DISCONTINUED | OUTPATIENT
Start: 2020-05-18 | End: 2020-05-22 | Stop reason: HOSPADM

## 2020-05-18 RX ORDER — QUETIAPINE FUMARATE 25 MG/1
50 TABLET, FILM COATED ORAL
Status: DISCONTINUED | OUTPATIENT
Start: 2020-05-18 | End: 2020-05-19

## 2020-05-18 RX ADMIN — QUETIAPINE FUMARATE 25 MG: 25 TABLET ORAL at 13:09

## 2020-05-18 RX ADMIN — LAMOTRIGINE 25 MG: 25 TABLET ORAL at 08:36

## 2020-05-18 RX ADMIN — DIVALPROEX SODIUM 1000 MG: 500 TABLET, EXTENDED RELEASE ORAL at 21:06

## 2020-05-18 RX ADMIN — QUETIAPINE FUMARATE 50 MG: 25 TABLET ORAL at 21:06

## 2020-05-18 NOTE — INTERDISCIPLINARY ROUNDS
Behavioral Health Interdisciplinary Rounds     Patient Name: March School  Age: 27 y.o. Room/Bed:  725/  Primary Diagnosis: <principal problem not specified>   Admission Status: Voluntary     Readmission within 30 days: no  Power of  in place: no  Patient requires a blocked bed: no          Reason for blocked bed:     VTE Prophylaxis: No    Mobility needs/Fall risk: no  Flu Vaccine : no   Nutritional Plan: no  Consults:          Labs/Testing due today?: no    Sleep hours:  7.5      Participation in Care/Groups:  yes  Medication Compliant?: Yes  PRNS (last 24 hours): Sleep Aid    Restraints (last 24 hours):  no     CIWA (range last 24 hours): CIWA-Ar Total: 0   COWS (range last 24 hours):      Alcohol screening (AUDIT) completed -   AUDIT Score: 16     If applicable, date SBIRT discussed in treatment team AND documented:   AUDIT Screen Score: AUDIT Score: 16      Document Brief Intervention (corresponds directly with the 5 A's, Ask, Advise, Assess, Assist, and Arrange): At- Risk Patients (Score 7-15 for women; 8-15 for men)  Discuss concern patient is drinking at unhealthy levels known to increase risk of alcohol-related health problems. Is Patient ready to commit to change? If No:   Encourage reflection   Discuss short term and long term health risks of consuming alcohol   Barriers to change   Reaffirm willingness to help / Educational materials provided  If Yes:   Set goal  [employer] provided    Harmful use or Dependence (Score 16 or greater)   Discuss short term and long term health risks of consuming alcohol   Recommendations   Negotiate drinking goal   Recommend addiction specialist/center   Arrange follow-up appointments.     Tobacco - patient is a smoker: Have You Used Tobacco in the Past 30 Days: Yes  Illegal Drugs use: Have You Used Any Illegal Substances Over the Past 12 Months: No    24 hour chart check complete: yes     Patient goal(s) for today: meet treatment team, acclimate to unit  Treatment team focus/goals: assess needs for treatment and safe discharge,  stop lamotrigine; start Depakote 1,000mg at PM, stop trazodone and start seroquel 50mg at PM for sleep,   Progress note: Pt is alert, oriented, calm and engaged with treatment. PT and team discussed treatment options and agreed to start Depakote. Pt shared in insurance will  20. He also noted a depressed mood today. LOS:  2  Expected LOS: TBD    Financial concerns/prescription coverage:  [employer] through 20.   Family contact:   Family requesting physician contact today:    Discharge plan: return home with roommates  Access to weapons : na    Outpatient provider(s): Michel Rutherford at [address] [place] [address]  Patient's preferred phone number for follow up call :      Participating treatment team members: hCrissy Dick NP; Chicho Peng, RUFUS; Ovidio Zamora, PharmD; Romana Dunning, MSW

## 2020-05-18 NOTE — PROGRESS NOTES
Pt appears to be sleeping. NAD, respirations WNL, will continue to monitor q15 for safety. Problem: Falls - Risk of  Goal: *Absence of Falls  Description: Document Eboni Almaraz Fall Risk and appropriate interventions in the flowsheet.   Outcome: Progressing Towards Goal  Note: Fall Risk Interventions:            Medication Interventions: Teach patient to arise slowly

## 2020-05-18 NOTE — PROGRESS NOTES
Problem: Depressed Mood (Adult/Pediatric)  Goal: *STG: Participates in treatment plan  Outcome: Progressing Towards Goal  Note: Out on unit passively engaged. Mood depressed and describes fleeting worthlessness and haplessness. Denies SI. Daily goal is to attend group. Staff focus is on medication education and encourage increase time out of bed.    Goal: *STG: Verbalizes anger, guilt, and other feelings in a constructive manor  Outcome: Progressing Towards Goal  Goal: *STG: Attends activities and groups  Outcome: Progressing Towards Goal  Goal: *STG: Demonstrates reduction in symptoms and increase in insight into coping skills/future focused  Outcome: Progressing Towards Goal  Goal: *STG: Complies with medication therapy  Outcome: Progressing Towards Goal  Goal: Interventions  Outcome: Progressing Towards Goal   1315: PRN Medication Documentation    Specific patient behavior that led to need for PRN medication: anxiety fleeting intrusive hopeless thoughts  Staff interventions attempted prior to PRN being given: coping skills, group and activiteies  PRN medication given: seroquel  Patient response/effectiveness of PRN medication: pt states anxiety decreased and is tolerable

## 2020-05-18 NOTE — BH NOTES
GROUP THERAPY PROGRESS NOTE    Marcio Blair is participating in Leisure-Creative Group. Group time: 15 minutes    Personal goal for participation: to relax and socialize with peers    Goal orientation: relaxation    Group therapy participation: active    Therapeutic interventions reviewed and discussed:  Compared the needs of a Plant to a Human to survive    Impression of participation: Patient was able to compare  the needs of a plant to a human. Socialized appropriately with peers.

## 2020-05-18 NOTE — GROUP NOTE
[facility] GROUP DOCUMENTATION INDIVIDUAL Group Therapy Note Date: 5/18/2020 Group Start Time: 1400 Group End Time: 1938 Group Topic: Topic Group [address] Brii Poster [facility] GROUP DOCUMENTATION GROUP Group Therapy Note Attendees: 5/6 Attendance: Attended Patient's Goal:  Discuss automatic negative thinking Interventions/techniques: Informed, Promoted peer support and Supported Follows Directions: Followed directions Interactions: Interacted appropriately Mental Status: Calm Behavior/appearance: Attentive and Cooperative Goals Achieved: Able to engage in interactions, Able to listen to others and Able to reflect/comment on own behavior Additional Notes: Staff led group discussing automatic negative thoughts. Staff provided packet of education and worksheets on how to recognize negative thinking patterns, how to question their validity, and practices to incorporate positive thinking to replace negative thinking. Staff discussed different forms of negative thinking patterns and discussed importance of recognizing situations and people that trigger these automatic negative thoughts. Lastly, staff addressed any questions and concerns regarding treatment team and discussed who is involved in their discharge planning. Pt engaged in group conversation when prompted by staff. He followed along with worksheet and nodded along with what staff and peers said. Jason Parry

## 2020-05-18 NOTE — BH NOTES
PSYCHOSOCIAL ASSESSMENT  :Patient identifying info:  Kenny Braxton is a 27 y.o., male admitted 5/16/2020  2:26 PM     Presenting problem and precipitating factors: Pt was voluntarily admitted to [facility] for worsening depression and SI following an intentional OD. Pt came to [facility] ED 5/14/20 by EMS reporting OD on 29 150 mg Wellbutrin, 29 40 mg Prozac, and 30 100 mg Lamictal. After OD patient texted his girlfriend who called 9-1-1. EMS reported \"natural burial\" and \"DNR\" written on the wall in patient's room when they picked him up. Pt transferred form ED to CCU for medical treatment prior to admission to [facility] for psychiatric treatment. 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 Mental status assessment: alert, oriented, depressed,      Strengths: supportive girlfriend, stable housing, engaged in treatment. Collateral information: girlfriend, Alicia Records ([phone])    Current psychiatric /substance abuse providers and contact info: Kathia Johnson, a PMHNP at [facility]; he is considering option to be linked with counselor but expressed limitations of finances and lack of a clear goal to work towards in counseling. Previous psychiatric/substance abuse providers and response to treatment: He states that he has been admitted to different psychiatric facilities such as [facility], [facility] and [facility].  He carries a diagnosis of bipolar I disorder. He is currently seeing Guillermo Miller, a PMHNP at [facility].     Family history of mental illness or substance abuse: none indicated    Substance abuse history:  EtOH  Social History     Tobacco Use    Smoking status: Current Every Day Smoker     Packs/day: 1.00     Years: 15.00     Pack years: 15.00     Types: Cigarettes    Smokeless tobacco: Never Used   Substance Use Topics    Alcohol use: Yes     Comment: Occasionally 10 beers a week       History of biomedical complications associated with substance abuse : none indicated    Patient's current acceptance of treatment or motivation for change: voluntary    Family constellation: no kids    Is significant other involved? Yes, girlfriend is supportive    Describe support system: girlfirned    Describe living arrangements and home environment: lives with 2 roommates    Health issues:   Hospital Problems  Date Reviewed: 2011          Codes Class Noted POA    Bipolar disorder [facility]) ICD-10-CM: F31.9  ICD-9-CM: 296.80  2020 Unknown              Trauma history: none indicated    Legal issues: none indicated    History of  service: no    Financial status: unemployed, recently laid off.     Quaker/cultural factors: Quaker    Education/work history: not assessed    Have you been licensed as a health care professional (current or ): no    Leisure and recreation preferences: not assessed    Describe coping skills: limited, ineffective    Kym Cisneros  2020

## 2020-05-18 NOTE — H&P
Psychiatry History and Physical    Subjective:     Date of Evaluation:  5/17/2020    History of Presenting Problem: Chrissy Gibbs is a 27year old male admitted to the [facility] after a medical admission for a suicide attempt via overdose of wellbutrin, prozac and lamictal. He was also drinking alcohol at the time. He has a history of Bipolar 1 Disorder. He started Wellbutrin about 2 weeks before the suicide attempt and feels that may have contributed to a change in mood. He feels his mood dropped all of a sudden. He is thankful to have survived the attempt. He was recently laid off due to COVID-19, he is making the same amount of money on unemployment but would like to be working. He does not drink alcohol everyday but is unable to stop drinking when he starts. He denies SI/HI/AH/VH. Patient Active Problem List    Diagnosis Date Noted    Bipolar affective disorder ([facility] 12/05/2011     Priority: 1 - One     Class: Chronic    Bipolar disorder ([facility] 05/16/2020    Overdose 05/14/2020    Mood swings 06/10/2011    Self mutilation 06/10/2011     Past Medical History:   Diagnosis Date    Anxiety disorder     Bipolar affective disorder ([address] 12/5/2011    Bipolar disorder ([address]     Chronic pain     right foot and back pain    Depression     Mood disorder ([address]     Self mutilation     hx of cutting self    Substance abuse ([facility]     Suicidal thoughts      No past surgical history on file. No family history on file. Social History     Tobacco Use    Smoking status: Current Every Day Smoker     Packs/day: 1.00     Years: 15.00     Pack years: 15.00     Types: Cigarettes    Smokeless tobacco: Never Used   Substance Use Topics    Alcohol use: Yes     Comment: Occasionally 10 beers a week     Prior to Admission medications    Medication Sig Start Date End Date Taking? Authorizing Provider   clonazePAM (KlonoPIN) 1 mg tablet Take 1 mg by mouth two (2) times a day.    Yes Provider, Historical   buPROPion XL (WELLBUTRIN XL) 150 mg tablet Take 150 mg by mouth daily. Yes Provider, Historical   FLUoxetine (PROzac) 40 mg capsule Take 40 mg by mouth daily. Yes Provider, Historical   lamoTRIgine (LaMICtal) 100 mg tablet Take 100 mg by mouth nightly.    Yes Provider, Historical     Allergies   Allergen Reactions    Ceclor [Cefaclor] Unknown (comments)          Objective:     Patient Vitals for the past 8 hrs:   BP Temp Pulse Resp SpO2   05/17/20 2011 124/73 98 °F (36.7 °C) 77 16 98 %   05/17/20 1529 112/76 97.7 °F (36.5 °C) 76 16 100 %       Mental Status exam: WNL       Impression:      Active Problems:    Bipolar disorder ([facility] (5/16/2020)          Plan:     Admit to [facility] further information  Medication management  Disposition planning with social work  Estimated length of stay 5-7 days

## 2020-05-18 NOTE — PROGRESS NOTES
PSYCHIATRIC PROGRESS NOTE       Patient: Cara Acosta MRN: [id number]  SSN: [id number]    YOB: 1985  Age: 27 y.o. Sex: male      Admit Date: 5/16/2020    LOS: 2 days       Chief Complaint:  My mood is low. Interval History:  Cara Acosta states his mood is pretty low today, slept poorly last night. Fleeting thoughts of si, but denies any plan. Denies hi or avh. Calm and pleasant. States he has hx of marshall, would like to try depakote and remove lamictal. No behavioral issues. Past Medical History:  Past Medical History:   Diagnosis Date    Anxiety disorder     Bipolar affective disorder ([facility] 12/5/2011    Bipolar disorder ([facility]     Chronic pain     right foot and back pain    Depression     Mood disorder (H[facility])     Self mutilation     hx of cutting self    Substance abuse ([facility]     Suicidal thoughts          ALLERGIES:(reviewed/updated 5/18/2020)  Allergies   Allergen Reactions    Ceclor [Cefaclor] Unknown (comments)       Laboratory report:  Lab Results   Component Value Date/Time    WBC 7.6 05/16/2020 04:24 AM    HGB 12.9 05/16/2020 04:24 AM    HCT 38.7 05/16/2020 04:24 AM    PLATELET 774 36/97/2524 04:24 AM    MCV 91.3 05/16/2020 04:24 AM      Lab Results   Component Value Date/Time    Sodium 138 05/16/2020 04:24 AM    Potassium 3.5 05/16/2020 04:24 AM    Chloride 105 05/16/2020 04:24 AM    CO2 25 05/16/2020 04:24 AM    Anion gap 8 05/16/2020 04:24 AM    Glucose 113 (H) 05/16/2020 04:24 AM    BUN 6 05/16/2020 04:24 AM    Creatinine 0.82 05/16/2020 04:24 AM    BUN/Creatinine ratio 7 (L) 05/16/2020 04:24 AM    GFR est AA >60 05/16/2020 04:24 AM    GFR est non-AA >60 05/16/2020 04:24 AM    Calcium 8.8 05/16/2020 04:24 AM    Bilirubin, total 0.6 05/16/2020 04:24 AM    AST (SGOT) 14 (L) 05/16/2020 04:24 AM    Alk.  phosphatase 49 05/16/2020 04:24 AM    Protein, total 6.8 05/16/2020 04:24 AM    Albumin 3.4 (L) 05/16/2020 04:24 AM    Globulin 3.4 05/16/2020 04:24 AM    A-G Ratio 1.0 (L) 05/16/2020 04:24 AM    ALT (SGPT) 16 05/16/2020 04:24 AM      Vitals:    05/17/20 1124 05/17/20 1529 05/17/20 2011 05/18/20 0813   BP: 123/80 112/76 124/73 112/75   Pulse: 77 76 77 83   Resp: 18 16 16 16   Temp: 98.4 °F (36.9 °C) 97.7 °F (36.5 °C) 98 °F (36.7 °C) 98.1 °F (36.7 °C)   SpO2:  100% 98% 98%   Weight:       Height:           No results found for: VALF2, VALAC, VALP, VALPR, DS6, CRBAM, CRBAMP, CARB2, XCRBAM  No results found for: LITHM    Vital Signs  Patient Vitals for the past 24 hrs:   Temp Pulse Resp BP SpO2   05/18/20 0813 98.1 °F (36.7 °C) 83 16 112/75 98 %   05/17/20 2011 98 °F (36.7 °C) 77 16 124/73 98 %   05/17/20 1529 97.7 °F (36.5 °C) 76 16 112/76 100 %   05/17/20 1124 98.4 °F (36.9 °C) 77 18 123/80 --     Wt Readings from Last 3 Encounters:   05/17/20 63.5 kg (140 lb)   05/15/20 65 kg (143 lb 4.8 oz)   01/14/18 67.1 kg (148 lb)     Temp Readings from Last 3 Encounters:   05/18/20 98.1 °F (36.7 °C)   05/16/20 98.3 °F (36.8 °C)   02/04/20 98.2 °F (36.8 °C)     BP Readings from Last 3 Encounters:   05/18/20 112/75   05/16/20 126/80   02/04/20 111/72     Pulse Readings from Last 3 Encounters:   05/18/20 83   05/16/20 91   02/04/20 73       Radiology (reviewed/updated 5/18/2020)  No results found. Side Effects: (reviewed/updated 5/18/2020)  None reported or admitted to.     Review of Systems: (reviewed/updated 5/18/2020)  Appetite: good  Sleep: good   All other Review of Systems: negative    Mental Status Exam:  Eye contact: Good eye contact  Psychomotor activity: Relaxed   Speech is spontaneous  Thought process: Logical and goal directed   Mood is \"ok\"  Affect: Full  Perception: No avh  Suicidal ideation: No si  Homicidal ideation: No hi  Insight/judgment: Partial  Cognition is grossly intact      Physical Exam:  Musculoskeletal system: steady gait  Tremor not present  Cog wheeling not present      Assessment and Plan:  Re Sylvester meets criteria for a diagnosis of bipolar I disorder, current episode depressed, without psychotic features. Dc lamictal.  Start depakote er 1000mg. Dc trazodone. Start seroquel to 50mg qhs. Continue rest of medications as prescribed. We will closely monitor for safety. We will encourage reality orientation. Disposition planning to continue. I certify that this patients inpatient psychiatric hospital services furnished since the previous certification were, and continue to be, required for treatment that could reasonably be expected to improve the patient's condition, or for diagnostic study, and that the patient continues to need, on a daily basis, active treatment furnished directly by or requiring the supervision of inpatient psychiatric facility personnel. In addition, the hospital records show that services furnished were intensive treatment services, admission or related services, or equivalent services.       Signed:  Denny Salazar NP  5/18/2020

## 2020-05-19 LAB
CHOLEST SERPL-MCNC: 181 MG/DL
HDLC SERPL-MCNC: 56 MG/DL
HDLC SERPL: 3.2 {RATIO} (ref 0–5)
LDLC SERPL CALC-MCNC: 105.2 MG/DL (ref 0–100)
LIPID PROFILE,FLP: ABNORMAL
TRIGL SERPL-MCNC: 99 MG/DL (ref ?–150)
VLDLC SERPL CALC-MCNC: 19.8 MG/DL

## 2020-05-19 PROCEDURE — 80061 LIPID PANEL: CPT

## 2020-05-19 PROCEDURE — 65220000003 HC RM SEMIPRIVATE PSYCH

## 2020-05-19 PROCEDURE — 36415 COLL VENOUS BLD VENIPUNCTURE: CPT

## 2020-05-19 PROCEDURE — 74011250637 HC RX REV CODE- 250/637: Performed by: NURSE PRACTITIONER

## 2020-05-19 RX ORDER — QUETIAPINE FUMARATE 100 MG/1
100 TABLET, FILM COATED ORAL
Status: DISCONTINUED | OUTPATIENT
Start: 2020-05-19 | End: 2020-05-22 | Stop reason: HOSPADM

## 2020-05-19 RX ADMIN — ACETAMINOPHEN 650 MG: 325 TABLET ORAL at 09:03

## 2020-05-19 RX ADMIN — QUETIAPINE FUMARATE 100 MG: 100 TABLET ORAL at 21:15

## 2020-05-19 RX ADMIN — DIVALPROEX SODIUM 1000 MG: 500 TABLET, EXTENDED RELEASE ORAL at 21:15

## 2020-05-19 NOTE — INTERDISCIPLINARY ROUNDS
Behavioral Health Interdisciplinary Rounds     Patient Name: Josafat Amezcua  Age: 27 y.o. Room/Bed:  5/  Primary Diagnosis: <principal problem not specified>   Admission Status: Voluntary     Readmission within 30 days: no  Power of  in place: no  Patient requires a blocked bed: no          Reason for blocked bed:     VTE Prophylaxis: No    Mobility needs/Fall risk: no  Flu Vaccine : no   Nutritional Plan: no  Consults:          Labs/Testing due today?: yes    Sleep hours: 7       Participation in Care/Groups:  yes  Medication Compliant?: refused one does of phenobarbital  PRNS (last 24 hours): milk of mag    Restraints (last 24 hours):  no     CIWA (range last 24 hours): CIWA-Ar Total: 0   COWS (range last 24 hours):      Alcohol screening (AUDIT) completed -   AUDIT Score: 16     If applicable, date SBIRT discussed in treatment team AND documented:   AUDIT Screen Score: AUDIT Score: 16      Document Brief Intervention (corresponds directly with the 5 A's, Ask, Advise, Assess, Assist, and Arrange): At- Risk Patients (Score 7-15 for women; 8-15 for men)  Discuss concern patient is drinking at unhealthy levels known to increase risk of alcohol-related health problems. Is Patient ready to commit to change? Yes    If Yes:   Set goal   Plan   Educational materials provided    Harmful use or Dependence (Score 16 or greater)   Discuss short term and long term health risks of consuming alcohol   Recommendations   Negotiate drinking goal   Recommend addiction specialist/center   Arrange follow-up appointments.     Tobacco - patient is a smoker: Have You Used Tobacco in the Past 30 Days: Yes  Illegal Drugs use: Have You Used Any Illegal Substances Over the Past 12 Months: No    24 hour chart check complete: yes     Patient goal(s) for today: attend groups, take medications  Treatment team focus/goals: titrate medication, coordinate follow up, increase seroquel to 100mg for sleep  Progress note: Pt is alert, oriented, calm and denying SI. He denies adverse effects of Depakote and feels he is tolerating it well. Plan to check Depakote level Thursday evening. He engaged in conversation about EtOH treatment through [address] or [facility]. He self identifies that EtOH is a contributor to his depression and SI but he does not believe PHP or IOP are needed and that he can achieve and maintain sobreity if his support system abstains from EtOH when he is present. Treatment team educated on the benefits of PHP and IOP in addition to social support. Pt was reluctant to consider EtOH treatment. LOS:  2  Expected LOS: TBD     Financial concerns/prescription coverage:  [employer] through 5/31/20.   Family contact:   Family requesting physician contact today:    Discharge plan: return home with roommates  Access to weapons : na                        Outpatient provider(s): Ashleigh Acosta at [address] [place] [address]  Patient's preferred phone number for follow up call :       Participating treatment team members: Tarah Méndez, Joss Khan NP; Chon Larsen RN; SHANA Gil

## 2020-05-19 NOTE — PROGRESS NOTES
Problem: Discharge Planning  Goal: *Discharge to safe environment  Outcome: Progressing Towards Goal  Note: Patient identifies home as safe environment and plans to return upon discharge. Goal: *Knowledge of medication management  Outcome: Progressing Towards Goal  Note: Patient is taking medications as prescribed. Goal: *Knowledge of discharge instructions  Outcome: Progressing Towards Goal  Note: Patient verbalizes understanding of goals for treatment and safe discharge.

## 2020-05-19 NOTE — PROGRESS NOTES
Visible on unit  Mood bright   Appropriately engaged with staff and peers  Denies SI or HI and agrees to inform staff if feelings to harm self or others occur          Problem: Depressed Mood (Adult/Pediatric)  Goal: *STG: Participates in treatment plan  Outcome: Progressing Towards Goal  Goal: *STG: Attends activities and groups  Outcome: Progressing Towards Goal  Goal: *STG: Demonstrates reduction in symptoms and increase in insight into coping skills/future focused  Outcome: Progressing Towards Goal  Goal: *STG: Complies with medication therapy  Outcome: Progressing Towards Goal

## 2020-05-19 NOTE — PROGRESS NOTES
1920 Patient continues to be engaged with peers, playing games in dining room and watching movies in dayroom. He is talkative, pleasant and calm. Pt out in milieu engaging with peers. No S/I. Pt goal: coping skills, discharge planning      Problem: Depressed Mood (Adult/Pediatric)  Goal: *STG: Attends activities and groups  Outcome: Progressing Towards Goal     Problem: Falls - Risk of  Goal: *Absence of Falls  Description: Document Laretta Yessy Fall Risk and appropriate interventions in the flowsheet. Outcome: Progressing Towards Goal  Note: Fall Risk Interventions:  Mobility Interventions: OT consult for ADLs    Mentation Interventions: Adequate sleep, hydration, pain control    Medication Interventions: Teach patient to arise slowly    Elimination Interventions:  Toilet paper/wipes in reach

## 2020-05-19 NOTE — PROGRESS NOTES
PSYCHIATRIC PROGRESS NOTE       Patient: Kevin Rosenthal MRN: [id number]  SSN: [id number]    YOB: 1985  Age: 27 y.o. Sex: male      Admit Date: 5/16/2020    LOS: 3 days       Chief Complaint:  Not too bad. Interval History:  Kevin Rosenthal states he slept poorly last night. He spoke to his friend and mother and it went ok. \"They're worried about me. \" Denies si hi or avh. He is tolerating initiation of depakote and denies side effects. Out in the unit and social with peers. Denies feeling depressed. Ambivalent about iop or php program. No behavioral issues. Past Medical History:  Past Medical History:   Diagnosis Date    Anxiety disorder     Bipolar affective disorder ([facility] 12/5/2011    Bipolar disorder ([facility]     Chronic pain     right foot and back pain    Depression     Mood disorder (HCC)     Self mutilation     hx of cutting self    [facility]     Suicidal thoughts          ALLERGIES:(reviewed/updated 5/19/2020)  Allergies   Allergen Reactions    Ceclor [Cefaclor] Unknown (comments)       Laboratory report:  Lab Results   Component Value Date/Time    WBC 7.6 05/16/2020 04:24 AM    HGB 12.9 05/16/2020 04:24 AM    HCT 38.7 05/16/2020 04:24 AM    PLATELET 415 74/86/0261 04:24 AM    MCV 91.3 05/16/2020 04:24 AM      Lab Results   Component Value Date/Time    Sodium 138 05/16/2020 04:24 AM    Potassium 3.5 05/16/2020 04:24 AM    Chloride 105 05/16/2020 04:24 AM    CO2 25 05/16/2020 04:24 AM    Anion gap 8 05/16/2020 04:24 AM    Glucose 113 (H) 05/16/2020 04:24 AM    BUN 6 05/16/2020 04:24 AM    Creatinine 0.82 05/16/2020 04:24 AM    BUN/Creatinine ratio 7 (L) 05/16/2020 04:24 AM    GFR est AA >60 05/16/2020 04:24 AM    GFR est non-AA >60 05/16/2020 04:24 AM    Calcium 8.8 05/16/2020 04:24 AM    Bilirubin, total 0.6 05/16/2020 04:24 AM    AST (SGOT) 14 (L) 05/16/2020 04:24 AM    Alk.  phosphatase 49 05/16/2020 04:24 AM    Protein, total 6.8 05/16/2020 04:24 AM    Albumin 3.4 (L) 05/16/2020 04:24 AM    Globulin 3.4 05/16/2020 04:24 AM    A-G Ratio 1.0 (L) 05/16/2020 04:24 AM    ALT (SGPT) 16 05/16/2020 04:24 AM      Vitals:    05/18/20 1112 05/18/20 1626 05/18/20 1935 05/19/20 0813   BP: 103/70 124/86 129/78 95/55   Pulse: 73 70 89 84   Resp: 16 16 20 18   Temp: 98.7 °F (37.1 °C) 98.3 °F (36.8 °C) 98.4 °F (36.9 °C) 98.4 °F (36.9 °C)   SpO2: 98% 100% 99% 99%   Weight:       Height:           No results found for: VALF2, VALAC, VALP, VALPR, DS6, CRBAM, CRBAMP, CARB2, XCRBAM  No results found for: LITHM    Vital Signs  Patient Vitals for the past 24 hrs:   Temp Pulse Resp BP SpO2   05/19/20 0813 98.4 °F (36.9 °C) 84 18 95/55 99 %   05/18/20 1935 98.4 °F (36.9 °C) 89 20 129/78 99 %   05/18/20 1626 98.3 °F (36.8 °C) 70 16 124/86 100 %   05/18/20 1112 98.7 °F (37.1 °C) 73 16 103/70 98 %     Wt Readings from Last 3 Encounters:   05/17/20 63.5 kg (140 lb)   05/15/20 65 kg (143 lb 4.8 oz)   01/14/18 67.1 kg (148 lb)     Temp Readings from Last 3 Encounters:   05/19/20 98.4 °F (36.9 °C)   05/16/20 98.3 °F (36.8 °C)   02/04/20 98.2 °F (36.8 °C)     BP Readings from Last 3 Encounters:   05/19/20 95/55   05/16/20 126/80   02/04/20 111/72     Pulse Readings from Last 3 Encounters:   05/19/20 84   05/16/20 91   02/04/20 73       Radiology (reviewed/updated 5/19/2020)  No results found. Side Effects: (reviewed/updated 5/19/2020)  None reported or admitted to.     Review of Systems: (reviewed/updated 5/19/2020)  Appetite: good  Sleep: good   All other Review of Systems: negative    Mental Status Exam:  Eye contact: Good eye contact  Psychomotor activity: Relaxed   Speech is spontaneous  Thought process: Logical and goal directed   Mood is \"ok\"  Affect: Full  Perception: No avh  Suicidal ideation: No si  Homicidal ideation: No hi  Insight/judgment: Partial  Cognition is grossly intact      Physical Exam:  Musculoskeletal system: steady gait  Tremor not present  Cog wheeling not present      Assessment and Plan:  Re Sylvester meets criteria for a diagnosis of bipolar I disorder, current episode depressed, without psychotic features. Increase seroquel to 100mg. Va level Thursday evening. Continue rest of medications as prescribed. We will closely monitor for safety. We will encourage reality orientation. Disposition planning to continue. I certify that this patients inpatient psychiatric hospital services furnished since the previous certification were, and continue to be, required for treatment that could reasonably be expected to improve the patient's condition, or for diagnostic study, and that the patient continues to need, on a daily basis, active treatment furnished directly by or requiring the supervision of inpatient psychiatric facility personnel. In addition, the hospital records show that services furnished were intensive treatment services, admission or related services, or equivalent services.       Signed:  Michael Villavicencio NP  5/19/2020

## 2020-05-19 NOTE — PROGRESS NOTES
Problem: Depressed Mood (Adult/Pediatric)  Goal: *STG: Participates in treatment plan  Outcome: Progressing Towards Goal  Note: Out on unit engaged and participating in groups. Smiling and reports anxiety is tolerable and much decreased since admission. Decrease in fleeting hopeless thoughts. Denies SI. Daily goal is to participate in groups.  Staff focus is on coping skills educaiton  Goal: *STG: Verbalizes anger, guilt, and other feelings in a constructive manor  Outcome: Progressing Towards Goal  Goal: *STG: Attends activities and groups  Outcome: Progressing Towards Goal  Goal: *STG: Demonstrates reduction in symptoms and increase in insight into coping skills/future focused  Outcome: Progressing Towards Goal  Goal: *STG: Complies with medication therapy  Outcome: Progressing Towards Goal  Goal: Interventions  Outcome: Progressing Towards Goal

## 2020-05-19 NOTE — PROGRESS NOTES
Problem: Falls - Risk of  Goal: *Absence of Falls  Description: Document Lara Livingston Fall Risk and appropriate interventions in the flowsheet. Outcome: Progressing Towards Goal  Note: Fall Risk Interventions:  Mobility Interventions: OT consult for ADLs    Mentation Interventions: Adequate sleep, hydration, pain control    Medication Interventions: Teach patient to arise slowly    Elimination Interventions:  Toilet paper/wipes in reach

## 2020-05-19 NOTE — GROUP NOTE
ANDRÉS  GROUP DOCUMENTATION INDIVIDUAL                                                                          Group Therapy Note    Date: 5/18/2020    Group Start Time: 2100  Group End Time: 2115  Group Topic: Reflection/Relaxation    [facility] 7W GEN BEHAV Robert Diaz LPN IP  GROUP DOCUMENTATION GROUP    Group Therapy Note    Attendees:        Attendance: Attended    Patient's Goal:decreased anxiety  Interventions/techniques: Supported    Follows Directions:  Followed directions    Interactions: Interacted appropriately    Mental Status: Calm    Behavior/appearance: Cooperative    Goals Achieved: Able to engage in interactions, Able to listen to others and Able to give feedback to another      Additional Notes:    Lana Wagner LPN

## 2020-05-20 PROCEDURE — 74011250637 HC RX REV CODE- 250/637: Performed by: NURSE PRACTITIONER

## 2020-05-20 PROCEDURE — 65220000003 HC RM SEMIPRIVATE PSYCH

## 2020-05-20 RX ADMIN — QUETIAPINE FUMARATE 25 MG: 25 TABLET ORAL at 13:23

## 2020-05-20 RX ADMIN — QUETIAPINE FUMARATE 100 MG: 100 TABLET ORAL at 21:35

## 2020-05-20 RX ADMIN — DIVALPROEX SODIUM 1000 MG: 500 TABLET, EXTENDED RELEASE ORAL at 21:34

## 2020-05-20 NOTE — PROGRESS NOTES
Problem: Depressed Mood (Adult/Pediatric)  Goal: *STG: Participates in treatment plan  Outcome: Progressing Towards Goal  Note: Patient is participatory in treatment team. Denies SI/HI. Mood is bright and affect is congruent with mood. Smiling and active on the milieu. No complaints and looking forward to discharge. Goal: *STG: Verbalizes anger, guilt, and other feelings in a constructive manor  Outcome: Progressing Towards Goal  Note: Verbalizes feelings constructively  Goal: *STG: Complies with medication therapy  Outcome: Progressing Towards Goal  Note: Compliant  Goal: Interventions  Outcome: Progressing Towards Goal     Problem: Falls - Risk of  Goal: *Absence of Falls  Description: Document Genny Fall Risk and appropriate interventions in the flowsheet. Outcome: Progressing Towards Goal  Note: Fall Risk Interventions:  Mobility Interventions: OT consult for ADLs    Mentation Interventions: Adequate sleep, hydration, pain control    Medication Interventions: Teach patient to arise slowly    Elimination Interventions:  Toilet paper/wipes in reach

## 2020-05-20 NOTE — GROUP NOTE
ANDRÉS  GROUP DOCUMENTATION INDIVIDUAL Group Therapy Note Date: 5/20/2020 Group Start Time: 2271 Group End Time: 0962 Group Topic: Topic Group [address] GROUP DOCUMENTATION GROUP Group Therapy Note Attendees: 4/7 Attendance: Attended Patient's Goal:  Discuss socializing and listen to podcast 
 
Interventions/techniques: Informed, Promoted peer support and Supported Follows Directions: Followed directions Interactions: Interacted appropriately Mental Status: Calm Behavior/appearance: Attentive and Cooperative Goals Achieved: Able to engage in interactions, Able to listen to others and Able to reflect/comment on own behavior Additional Notes: Staff met with patients to provide snacks and to discuss health benefits and importance of socializing. Staff outlined healthy relationships and discussed healthy activities rather than unhealthy activities (alcohol, drugs, etc.) Staff put on podcast that discussed socializing during [facility]. Pt actively engaged in group conversation and listened to podcast.  
 
Dominic Ryder

## 2020-05-20 NOTE — INTERDISCIPLINARY ROUNDS
Behavioral Health Interdisciplinary Rounds     Patient Name: Idris Ferrer  Age: 27 y.o. Room/Bed:  725/  Primary Diagnosis: <principal problem not specified>   Admission Status: Voluntary     Readmission within 30 days: no  Power of  in place: no  Patient requires a blocked bed: no          Reason for blocked bed:     VTE Prophylaxis: No    Mobility needs/Fall risk: no  Flu Vaccine : no   Nutritional Plan: no  Consults:          Labs/Testing due today?: no    Sleep hours:7        Participation in Care/Groups:  yes  Medication Compliant?: Yes  PRNS (last 24 hours): Pain    Restraints (last 24 hours):  no     CIWA (range last 24 hours): CIWA-Ar Total: 0   COWS (range last 24 hours):      Alcohol screening (AUDIT) completed -   AUDIT Score: 16     If applicable, date SBIRT discussed in treatment team AND documented:   AUDIT Screen Score: AUDIT Score: 16     No:   Encourage reflection   Discuss short term and long term health risks of consuming alcohol   Barriers to change   Reaffirm willingness to help / Educational materials provided    Harmful use or Dependence (Score 16 or greater)   Discuss short term and long term health risks of consuming alcohol   Recommendations   Negotiate drinking goal   Recommend addiction specialist/center   Arrange follow-up appointments. Tobacco - patient is a smoker: Have You Used Tobacco in the Past 30 Days: Yes  Illegal Drugs use: Have You Used Any Illegal Substances Over the Past 12 Months: No    24 hour chart check complete: yes     Patient goal(s) for today:   Treatment team focus/goals: Plan to get a Depakote level Thursday evening. Plan to set up IOP   Progress note : He has been pleasant and compliant with his treatment. Denies suicidal ideations. No detox symptoms.       LOS:  4  Expected LOS: Friday     Financial concerns/prescription coverage:  [employer]   Family contact:       Family requesting physician contact today:    Discharge plan: he will return home when ready for discharge   Access to weapons :  no       Outpatient provider(s): Mayi Jiang   Patient's preferred phone number for follow up call :     Participating treatment team members: Kadie Cho RN

## 2020-05-20 NOTE — BH NOTES
PRN Medication Documentation    Specific patient behavior that led to need for PRN medication: increased anxiety  Staff interventions attempted prior to PRN being given: Deep breathing.  Walking  PRN medication given: Quetiapine 25 mg given at 1323  Patient response/effectiveness of PRN medication: TBD

## 2020-05-20 NOTE — GROUP NOTE
ANDRÉS  GROUP DOCUMENTATION INDIVIDUAL                                                                          Group Therapy Note    Date: 5/19/2020    Group Start Time: 2045  Group End Time: 2115  Group Topic: Nursing    [facility] 7W GEN BEHAV TH    Payal Wu LPN    IP [address] [facility] GROUP DOCUMENTATION GROUP    Group Therapy Note    Attendees:        Attendance: Attended    Patient's Goal: treatment team education  Interventions/techniques: Supported    Follows Directions:  Followed directions    Interactions: Interacted appropriately    Mental Status: Calm    Behavior/appearance: Cooperative    Goals Achieved: Able to engage in interactions, Able to listen to others and Able to give feedback to another      Additional Notes:   Linder Bloch, LPN

## 2020-05-20 NOTE — PROGRESS NOTES
PSYCHIATRIC PROGRESS NOTE       Patient: Wolf Leo MRN: [id number]  SSN: [id number]    YOB: 1985  Age: 27 y.o. Sex: male      Admit Date: 5/16/2020    LOS: 4 days       Chief Complaint:  I slept better last night. Interval History:  Wolf Leo is calm and pleasant. States he slept better last night. States depression is 1/10. He is tolerating his meds and denies side effects. He is now interested in doing IOP after discharge. He is social in the unit, attending groups, feels he is benefiting from them. Denies si hi or avh. No management issue. Past Medical History:  Past Medical History:   Diagnosis Date    Anxiety disorder     Bipolar [facility] disorder ([facility] 12/5/2011    Bipolar disorder ([facility]     Chronic pain     right foot and back pain    Depression     Mood disorder (HCC)     Self mutilation     hx of cutting self    Substance abuse ([facility]     Suicidal thoughts          ALLERGIES:(reviewed/updated 5/20/2020)  Allergies   Allergen Reactions    Ceclor [Cefaclor] Unknown (comments)       Laboratory report:  Lab Results   Component Value Date/Time    WBC 7.6 05/16/2020 04:24 AM    HGB 12.9 05/16/2020 04:24 AM    HCT 38.7 05/16/2020 04:24 AM    PLATELET 594 31/20/1079 04:24 AM    MCV 91.3 05/16/2020 04:24 AM      Lab Results   Component Value Date/Time    Sodium 138 05/16/2020 04:24 AM    Potassium 3.5 05/16/2020 04:24 AM    Chloride 105 05/16/2020 04:24 AM    CO2 25 05/16/2020 04:24 AM    Anion gap 8 05/16/2020 04:24 AM    Glucose 113 (H) 05/16/2020 04:24 AM    BUN 6 05/16/2020 04:24 AM    Creatinine 0.82 05/16/2020 04:24 AM    BUN/Creatinine ratio 7 (L) 05/16/2020 04:24 AM    GFR est AA >60 05/16/2020 04:24 AM    GFR est non-AA >60 05/16/2020 04:24 AM    Calcium 8.8 05/16/2020 04:24 AM    Bilirubin, total 0.6 05/16/2020 04:24 AM    AST (SGOT) 14 (L) 05/16/2020 04:24 AM    Alk.  phosphatase 49 05/16/2020 04:24 AM    Protein, total 6.8 05/16/2020 04:24 AM    Albumin 3.4 (L) 05/16/2020 04:24 AM    Globulin 3.4 05/16/2020 04:24 AM    A-G Ratio 1.0 (L) 05/16/2020 04:24 AM    ALT (SGPT) 16 05/16/2020 04:24 AM      Vitals:    05/19/20 1118 05/19/20 1600 05/19/20 2048 05/20/20 0741   BP: 123/79 120/82 126/82 112/76   Pulse: 72 72 83 70   Resp: 18 18 20 18   Temp: 98.5 °F (36.9 °C) 98.7 °F (37.1 °C) 98.8 °F (37.1 °C) 96.9 °F (36.1 °C)   SpO2: 100% 97% 95% 99%   Weight:       Height:           No results found for: VALF2, VALAC, VALP, VALPR, DS6, CRBAM, CRBAMP, CARB2, XCRBAM  No results found for: LITHM    Vital Signs  Patient Vitals for the past 24 hrs:   Temp Pulse Resp BP SpO2   05/20/20 0741 96.9 °F (36.1 °C) 70 18 112/76 99 %   05/19/20 2048 98.8 °F (37.1 °C) 83 20 126/82 95 %   05/19/20 1600 98.7 °F (37.1 °C) 72 18 120/82 97 %   05/19/20 1118 98.5 °F (36.9 °C) 72 18 123/79 100 %     Wt Readings from Last 3 Encounters:   05/17/20 63.5 kg (140 lb)   05/15/20 65 kg (143 lb 4.8 oz)   01/14/18 67.1 kg (148 lb)     Temp Readings from Last 3 Encounters:   05/20/20 96.9 °F (36.1 °C)   05/16/20 98.3 °F (36.8 °C)   02/04/20 98.2 °F (36.8 °C)     BP Readings from Last 3 Encounters:   05/20/20 112/76   05/16/20 126/80   02/04/20 111/72     Pulse Readings from Last 3 Encounters:   05/20/20 70   05/16/20 91   02/04/20 73       Radiology (reviewed/updated 5/20/2020)  No results found. Side Effects: (reviewed/updated 5/20/2020)  None reported or admitted to.     Review of Systems: (reviewed/updated 5/20/2020)  Appetite: good  Sleep: good   All other Review of Systems: negative    Mental Status Exam:  Eye contact: Good eye contact  Psychomotor activity: Relaxed   Speech is spontaneous  Thought process: Logical and goal directed   Mood is \"ok\"  Affect: Full  Perception: No avh  Suicidal ideation: No si  Homicidal ideation: No hi  Insight/judgment: Partial  Cognition is grossly intact      Physical Exam:  Musculoskeletal system: steady gait  Tremor not present  Cog wheeling not present      Assessment and Plan:  Kenny Braxton meets criteria for a diagnosis of bipolar I disorder, current episode depressed, without psychotic features. Va level Thursday evening. Continue current medications as prescribed. We will closely monitor for safety. We will encourage reality orientation. Disposition planning to continue. I certify that this patients inpatient psychiatric hospital services furnished since the previous certification were, and continue to be, required for treatment that could reasonably be expected to improve the patient's condition, or for diagnostic study, and that the patient continues to need, on a daily basis, active treatment furnished directly by or requiring the supervision of inpatient psychiatric facility personnel. In addition, the hospital records show that services furnished were intensive treatment services, admission or related services, or equivalent services.       Signed:  Jorge Day NP  5/20/2020

## 2020-05-20 NOTE — PROGRESS NOTES
Problem: Falls - Risk of  Goal: *Absence of Falls  Description: Document Eileen Spencer Fall Risk and appropriate interventions in the flowsheet. Outcome: Progressing Towards Goal  Note: Fall Risk Interventions:  Mobility Interventions: OT consult for ADLs    Mentation Interventions: Adequate sleep, hydration, pain control    Medication Interventions: Teach patient to arise slowly    Elimination Interventions: Toilet paper/wipes in reach    Pt.received resting in bed, NAD, respirations even and unlabored. Will continue q15 safety checks.

## 2020-05-21 LAB — VALPROATE SERPL-MCNC: 52 UG/ML (ref 50–100)

## 2020-05-21 PROCEDURE — 74011250637 HC RX REV CODE- 250/637: Performed by: NURSE PRACTITIONER

## 2020-05-21 PROCEDURE — 65220000003 HC RM SEMIPRIVATE PSYCH

## 2020-05-21 PROCEDURE — 36415 COLL VENOUS BLD VENIPUNCTURE: CPT

## 2020-05-21 PROCEDURE — 80164 ASSAY DIPROPYLACETIC ACD TOT: CPT

## 2020-05-21 RX ADMIN — QUETIAPINE FUMARATE 100 MG: 100 TABLET ORAL at 21:12

## 2020-05-21 RX ADMIN — DIVALPROEX SODIUM 1000 MG: 500 TABLET, EXTENDED RELEASE ORAL at 21:12

## 2020-05-21 NOTE — PROGRESS NOTES
Problem: Depressed Mood (Adult/Pediatric)  Goal: *STG: Verbalizes anger, guilt, and other feelings in a constructive manor  Outcome: Progressing Towards Goal  Goal: *STG: Demonstrates reduction in symptoms and increase in insight into coping skills/future focused  Outcome: Progressing Towards Goal    Pt is alert and oriented. Visible on the unit, social with peers, pleasant and cooperative. Compliant with meals and medication.

## 2020-05-21 NOTE — BH NOTES
GROUP THERAPY PROGRESS NOTE    [employer] is participating in [address] time: 45 minutes    Personal goal for participation: Prep for Today     Goal orientation: Making The Most of Your Tx Team    Group therapy participation: minimal    Therapeutic interventions reviewed and discussed: Yes    Impression of participation: Pt was able to fully participate in morning group. Pt told new peer the rules of the unit. He reserved sharing his story but overall did well.

## 2020-05-21 NOTE — GROUP NOTE
ANDRÉS  GROUP DOCUMENTATION INDIVIDUAL Group Therapy Note Date: 5/20/2020 Group Start Time: 2040 Group End Time: 2105 Group Topic: Reflection/Relaxation [address] [place] 
 
[facility] GROUP DOCUMENTATION GROUP Group Therapy Note Attendees:  
 
  
 
Attendance: Attended Patient's Goal:  Unit orientation and daily progress Interventions/techniques: Supported Follows Directions: Followed directions Interactions: Interacted appropriately Mental Status: Calm Behavior/appearance: Attentive and Cooperative Goals Achieved: Able to engage in interactions and Able to listen to others Additional Notes:  Seems in fair spirits. Voiced no complaints. [facility] [address]

## 2020-05-21 NOTE — PROGRESS NOTES
Problem: Falls - Risk of  Goal: *Absence of Falls  Description: Document Earma Gavin Fall Risk and appropriate interventions in the flowsheet. Outcome: Progressing Towards Goal  Note: Fall Risk Interventions:  Mobility Interventions: OT consult for ADLs    Mentation Interventions: Adequate sleep, hydration, pain control    Medication Interventions: Teach patient to arise slowly    Elimination Interventions:  Toilet paper/wipes in reach

## 2020-05-21 NOTE — PROGRESS NOTES
Patient participated in [address] [place] [address] on 5/21/2020. Visited by Rev. Neris Mitchell MDiv, [facility], [facility] paging service: [phone])

## 2020-05-21 NOTE — PROGRESS NOTES
Problem: Depressed Mood (Adult/Pediatric)  Goal: *STG: Participates in treatment plan  Outcome: Progressing Towards Goal  Pt's affect is appropriate to situation. He participates in all therapeutic activities.

## 2020-05-21 NOTE — INTERDISCIPLINARY ROUNDS
Behavioral Health Interdisciplinary Rounds     Patient Name: Tarah Méndez  Age: 27 y.o. Room/Bed:  748/  Primary Diagnosis: <principal problem not specified>   Admission Status: Voluntary     Readmission within 30 days: no  Power of  in place: no  Patient requires a blocked bed: no          Reason for blocked bed:     VTE Prophylaxis: No    Mobility needs/Fall risk: no  Flu Vaccine : no   Nutritional Plan: no  Consults:          Labs/Testing due today?: yes    Sleep hours: 7.25       Participation in Care/Groups:  yes  Medication Compliant?: Yes  PRNS (last 24 hours): Antianxiety    Restraints (last 24 hours):  no     CIWA (range last 24 hours): CIWA-Ar Total: 0   COWS (range last 24 hours):      Alcohol screening (AUDIT) completed -   AUDIT Score: 16     If applicable, date SBIRT discussed in treatment team AND documented:   AUDIT Screen Score: AUDIT Score: 16      Document Brief Intervention (corresponds directly with the 5 A's, Ask, Advise, Assess, Assist, and Arrange): At- Risk Patients (Score 7-15 for women; 8-15 for men)  Discuss concern patient is drinking at unhealthy levels known to increase risk of alcohol-related health problems. Is Patient ready to commit to change? Yes     If Yes:   Set goal   Plan   Educational materials provided    Harmful use or Dependence (Score 16 or greater)   Discuss short term and long term health risks of consuming alcohol   Recommendations   Negotiate drinking goal   Recommend addiction specialist/center   Arrange follow-up appointments.     Tobacco - patient is a smoker: Have You Used Tobacco in the Past 30 Days: Yes  Illegal Drugs use: Have You Used Any Illegal Substances Over the Past 12 Months: No    24 hour chart check complete: yes     Patient goal(s) for today:   Treatment team focus/goals: plan for discharge on Friday   Progress note : he has been sleeping and eating well on the unit,     LOS:  4  Expected LOS: Friday     Financial concerns/prescription coverage:  [employer]   Family contact:       Family requesting physician contact today:    Discharge plan: he will return home   Access to weapons :  no       Outpatient provider(s): Joslyn Rivas NP and   Patient's preferred phone number for follow up call :     Participating treatment team members: CASEY Toth - Ranjan Taylor RN - Meena Salazar NP

## 2020-05-21 NOTE — PROGRESS NOTES
PSYCHIATRIC PROGRESS NOTE       Patient: Milo Bajwa MRN: [id number]  SSN: [id number]    YOB: 1985  Age: 27 y.o. Sex: male      Admit Date: 5/16/2020    LOS: 5 days       Chief Complaint:  Not too bad. Interval History:  Milo Bajwa is making progress. He is pleasant and cheerful. He is tolerating his meds and denies side effects. He is interested in doing IOP after discharge. He is social in the unit, attending groups, feels he is benefiting from them. Denies si hi or avh. Future oriented. No management issue. Past Medical History:  Past Medical History:   Diagnosis Date    Anxiety disorder     Bipolar affective disorder ([facility] 12/5/2011    Bipolar disorder ([facility]     Chronic pain     right foot and back pain    Depression     Mood disorder (HCC)     Self mutilation     hx of cutting self    Substance abuse ([facility]     Suicidal thoughts          ALLERGIES:(reviewed/updated 5/21/2020)  Allergies   Allergen Reactions    Ceclor [Cefaclor] Unknown (comments)       Laboratory report:  Lab Results   Component Value Date/Time    WBC 7.6 05/16/2020 04:24 AM    HGB 12.9 05/16/2020 04:24 AM    HCT 38.7 05/16/2020 04:24 AM    PLATELET 455 17/20/1032 04:24 AM    MCV 91.3 05/16/2020 04:24 AM      Lab Results   Component Value Date/Time    Sodium 138 05/16/2020 04:24 AM    Potassium 3.5 05/16/2020 04:24 AM    Chloride 105 05/16/2020 04:24 AM    CO2 25 05/16/2020 04:24 AM    Anion gap 8 05/16/2020 04:24 AM    Glucose 113 (H) 05/16/2020 04:24 AM    BUN 6 05/16/2020 04:24 AM    Creatinine 0.82 05/16/2020 04:24 AM    BUN/Creatinine ratio 7 (L) 05/16/2020 04:24 AM    GFR est AA >60 05/16/2020 04:24 AM    GFR est non-AA >60 05/16/2020 04:24 AM    Calcium 8.8 05/16/2020 04:24 AM    Bilirubin, total 0.6 05/16/2020 04:24 AM    AST (SGOT) 14 (L) 05/16/2020 04:24 AM    Alk.  phosphatase 49 05/16/2020 04:24 AM    Protein, total 6.8 05/16/2020 04:24 AM    Albumin 3.4 (L) 05/16/2020 04:24 AM    Globulin 3.4 05/16/2020 04:24 AM    A-G Ratio 1.0 (L) 05/16/2020 04:24 AM    ALT (SGPT) 16 05/16/2020 04:24 AM      Vitals:    05/20/20 0741 05/20/20 1532 05/20/20 1934 05/21/20 0855   BP: 112/76 126/78 104/75 103/71   Pulse: 70 90 89 78   Resp: 18 18 16 16   Temp: 96.9 °F (36.1 °C) 98.5 °F (36.9 °C) 98.9 °F (37.2 °C)    SpO2: 99% 100% 100% 100%   Weight:       Height:           No results found for: VALF2, VALAC, VALP, VALPR, DS6, CRBAM, CRBAMP, CARB2, XCRBAM  No results found for: LITHM    Vital Signs  Patient Vitals for the past 24 hrs:   Temp Pulse Resp BP SpO2   05/21/20 0855 -- 78 16 103/71 100 %   05/20/20 1934 98.9 °F (37.2 °C) 89 16 104/75 100 %   05/20/20 1532 98.5 °F (36.9 °C) 90 18 126/78 100 %     Wt Readings from Last 3 Encounters:   05/17/20 63.5 kg (140 lb)   05/15/20 65 kg (143 lb 4.8 oz)   01/14/18 67.1 kg (148 lb)     Temp Readings from Last 3 Encounters:   05/20/20 98.9 °F (37.2 °C)   05/16/20 98.3 °F (36.8 °C)   02/04/20 98.2 °F (36.8 °C)     BP Readings from Last 3 Encounters:   05/21/20 103/71   05/16/20 126/80   02/04/20 111/72     Pulse Readings from Last 3 Encounters:   05/21/20 78   05/16/20 91   02/04/20 73       Radiology (reviewed/updated 5/21/2020)  No results found. Side Effects: (reviewed/updated 5/21/2020)  None reported or admitted to.     Review of Systems: (reviewed/updated 5/21/2020)  Appetite: good  Sleep: good   All other Review of Systems: negative    Mental Status Exam:  Eye contact: Good eye contact  Psychomotor activity: Relaxed   Speech is spontaneous  Thought process: Logical and goal directed   Mood is \"ok\"  Affect: Full  Perception: No avh  Suicidal ideation: No si  Homicidal ideation: No hi  Insight/judgment: Partial  Cognition is grossly intact      Physical Exam:  Musculoskeletal system: steady gait  Tremor not present  Cog wheeling not present      Assessment and Plan:  Latoya Joshi meets criteria for a diagnosis of bipolar I disorder, current episode depressed, without psychotic features. Va level this evening. Continue current medications as prescribed. We will closely monitor for safety. We will encourage reality orientation. Plan for dc tomorrow am.      I certify that this patients inpatient psychiatric hospital services furnished since the previous certification were, and continue to be, required for treatment that could reasonably be expected to improve the patient's condition, or for diagnostic study, and that the patient continues to need, on a daily basis, active treatment furnished directly by or requiring the supervision of inpatient psychiatric facility personnel. In addition, the hospital records show that services furnished were intensive treatment services, admission or related services, or equivalent services.       Signed:  Karen Banuelos NP  5/21/2020

## 2020-05-21 NOTE — PROGRESS NOTES
Problem: Depressed Mood (Adult/Pediatric)  Goal: *STG: Participates in treatment plan  Outcome: Progressing Towards Goal  Note: Out on unit social and engaged. Mood and affect decrease in anxiety and reports hope. Denies SI. Daily goal is to continue to be engaged and participate in group and discuss d/c date.  Staff focus is on d/c planning and copings skills education  Goal: *STG: Verbalizes anger, guilt, and other feelings in a constructive manor  Outcome: Progressing Towards Goal  Goal: *STG: Demonstrates reduction in symptoms and increase in insight into coping skills/future focused  Outcome: Progressing Towards Goal  Goal: Interventions  Outcome: Progressing Towards Goal

## 2020-05-21 NOTE — BH NOTES
GROUP THERAPY PROGRESS NOTE    Sarah Hernandez is participating in Discharge Planning Group    Group time: 45 minutes    Personal goal for participation: Readiness for Discharge Planning     Goal orientation: Crisis Mnagement    Group therapy participation: active and minimal    Therapeutic interventions reviewed and discussed: No    Impression of participation: Limited input - more generalized statements verus self disclosures - accepted by his peers

## 2020-05-21 NOTE — PROGRESS NOTES
Laboratory Monitoring for Antipsychotics: This patient is currently prescribed the following medication(s):   Current Facility-Administered Medications   Medication Dose Route Frequency    QUEtiapine (SEROquel) tablet 100 mg  100 mg Oral QHS    divalproex ER (DEPAKOTE ER) 24 hour tablet 1,000 mg  1,000 mg Oral QHS    nicotine (NICODERM CQ) 21 mg/24 hr patch 1 Patch  1 Patch TransDERmal DAILY     The following labs have been completed for monitoring of antipsychotics and/or mood stabilizers:    Height, Weight, BMI Estimation  Estimated body mass index is 18.99 kg/m² as calculated from the following:    Height as of this encounter: 182.9 cm (72\"). Weight as of this encounter: 63.5 kg (140 lb). Vital Signs/Blood Pressure  Visit Vitals  /71   Pulse 78   Temp 98.9 °F (37.2 °C)   Resp 16   Ht 182.9 cm (72\")   Wt 63.5 kg (140 lb)   SpO2 100%   BMI 18.99 kg/m²     Renal Function, Hepatic Function and Chemistry  Estimated Creatinine Clearance: 112.9 mL/min (by C-G formula based on SCr of 0.82 mg/dL). Lab Results   Component Value Date/Time    Sodium 138 05/16/2020 04:24 AM    Potassium 3.5 05/16/2020 04:24 AM    Chloride 105 05/16/2020 04:24 AM    CO2 25 05/16/2020 04:24 AM    Anion gap 8 05/16/2020 04:24 AM    BUN 6 05/16/2020 04:24 AM    Creatinine 0.82 05/16/2020 04:24 AM    BUN/Creatinine ratio 7 (L) 05/16/2020 04:24 AM    Bilirubin, total 0.6 05/16/2020 04:24 AM    Protein, total 6.8 05/16/2020 04:24 AM    Albumin 3.4 (L) 05/16/2020 04:24 AM    Globulin 3.4 05/16/2020 04:24 AM    A-G Ratio 1.0 (L) 05/16/2020 04:24 AM    ALT (SGPT) 16 05/16/2020 04:24 AM    Alk.  phosphatase 49 05/16/2020 04:24 AM     Lab Results   Component Value Date/Time    Glucose 113 (H) 05/16/2020 04:24 AM     No results found for: HBA1C, HGBE8, UCA0KRQU    Hematology  Lab Results   Component Value Date/Time    WBC 7.6 05/16/2020 04:24 AM    RBC 4.24 05/16/2020 04:24 AM    HGB 12.9 05/16/2020 04:24 AM    HCT 38.7 05/16/2020 04:24 AM    MCV 91.3 05/16/2020 04:24 AM    MCH 30.4 05/16/2020 04:24 AM    MCHC 33.3 05/16/2020 04:24 AM    RDW 12.8 05/16/2020 04:24 AM    PLATELET 842 44/69/7706 04:24 AM     Lipids  Lab Results   Component Value Date/Time    Cholesterol, total 181 05/19/2020 06:21 AM    HDL Cholesterol 56 05/19/2020 06:21 AM    LDL, calculated 105.2 (H) 05/19/2020 06:21 AM    Triglyceride 99 05/19/2020 06:21 AM    CHOL/HDL Ratio 3.2 05/19/2020 06:21 AM     Thyroid Function  Lab Results   Component Value Date/Time    TSH 0.73 10/29/2010 09:43 PM     Assessment/Plan:  Recommended baseline laboratory monitoring has been completed based on this patient's current medication regimen. No further interventions are needed at this time. Follow-up metabolic monitoring labs should be completed in 3 months (quarterly for the first year of antipsychotic therapy).      Frieda Vazquez, KRISD

## 2020-05-22 VITALS
SYSTOLIC BLOOD PRESSURE: 95 MMHG | BODY MASS INDEX: 18.96 KG/M2 | TEMPERATURE: 98.4 F | OXYGEN SATURATION: 99 % | DIASTOLIC BLOOD PRESSURE: 56 MMHG | WEIGHT: 140 LBS | HEIGHT: 72 IN | RESPIRATION RATE: 16 BRPM | HEART RATE: 84 BPM

## 2020-05-22 PROCEDURE — 74011250637 HC RX REV CODE- 250/637: Performed by: NURSE PRACTITIONER

## 2020-05-22 RX ORDER — IBUPROFEN 200 MG
1 TABLET ORAL EVERY 24 HOURS
Qty: 30 PATCH | Refills: 0 | Status: SHIPPED | OUTPATIENT
Start: 2020-05-22 | End: 2020-06-21

## 2020-05-22 RX ORDER — DIVALPROEX SODIUM 500 MG/1
1500 TABLET, EXTENDED RELEASE ORAL
Qty: 90 TAB | Refills: 0 | Status: SHIPPED | OUTPATIENT
Start: 2020-05-22 | End: 2021-06-05

## 2020-05-22 RX ORDER — QUETIAPINE FUMARATE 100 MG/1
100 TABLET, FILM COATED ORAL
Qty: 30 TAB | Refills: 0 | Status: SHIPPED | OUTPATIENT
Start: 2020-05-22 | End: 2021-06-05

## 2020-05-22 NOTE — PROGRESS NOTES
Problem: Depressed Mood (Adult/Pediatric)  Goal: *STG: Participates in treatment plan  Outcome: Progressing Towards Goal  Note: Out on unit engaged and hopeful anticipating d/c for today to home. States would like nicotine patch prescription. Will review in tx team. Daily goal is to d/c home. Staff focus is on d/c plans and follow up care.    Goal: *STG: Demonstrates reduction in symptoms and increase in insight into coping skills/future focused  Outcome: Progressing Towards Goal  Goal: Interventions  Outcome: Progressing Towards Goal

## 2020-05-22 NOTE — DISCHARGE INSTRUCTIONS
DISCHARGE SUMMARY    NAME:Shin Mcneal  : 1985  MRN: [id number]    The patient Sarah Hernandez exhibits the ability to control behavior in a less restrictive environment. Patient's level of functioning is improving. No assaultive/destructive behavior has been observed for the past 24 hours. No suicidal/homicidal threat or behavior has been observed for the past 24 hours. There is no evidence of serious medication side effects. Patient has not been in physical or protective restraints for at least the past 24 hours. If weapons involved, how are they secured? NA    Is patient aware of and in agreement with discharge plan? Yes    Arrangements for medication:  Prescriptions sent to his pharmacy. Copy of discharge instructions to provider?: [facility] attn: EZEQUIEL Green ([phone])JOAN to [address] [phone]  Arrangements for transportation home:  Lyft to      Keep all follow up appointments as scheduled, continue to take prescribed medications per physician instructions. Mental health crisis number:  031 or your local mental health crisis line number at [phone]. DISCHARGE SUMMARY from Nurse    PATIENT INSTRUCTIONS:      What to do at Home:  Recommended activity: Activity as tolerated,     If you experience any of the following symptoms thoughts of harming self, feeling overwhelmed with hopelessness and anxiety, please follow up with your assigned therapist and provider Brook Arriaga. If you can not reach them or their office and symptoms continue immediately call your local crisis number at [phone]      *  Please give a list of your current medications to your Primary Care Provider. *  Please update this list whenever your medications are discontinued, doses are      changed, or new medications (including over-the-counter products) are added. *  Please carry medication information at all times in case of emergency situations.     These are general instructions for a healthy lifestyle:    No smoking/ No tobacco products/ Avoid exposure to second hand smoke  Surgeon General's Warning:  Quitting smoking now greatly reduces serious risk to your health. Obesity, smoking, and sedentary lifestyle greatly increases your risk for illness    A healthy diet, regular physical exercise & weight monitoring are important for maintaining a healthy lifestyle    You may be retaining fluid if you have a history of heart failure or if you experience any of the following symptoms:  Weight gain of 3 pounds or more overnight or 5 pounds in a week, increased swelling in our hands or feet or shortness of breath while lying flat in bed. Please call your doctor as soon as you notice any of these symptoms; do not wait until your next office visit. The discharge information has been reviewed with the patient. The patient verbalized understanding. Discharge medications reviewed with the patient and appropriate educational materials and side effects teaching were provided.   ___________________________________________________________________________________________________________________________________

## 2020-05-22 NOTE — PROGRESS NOTES
Problem: Falls - Risk of  Goal: *Absence of Falls  Description: Document Alonso Mehta Fall Risk and appropriate interventions in the flowsheet. Outcome: Progressing Towards Goal  Note: Fall Risk Interventions:  Mobility Interventions: OT consult for ADLs    Mentation Interventions: Adequate sleep, hydration, pain control    Medication Interventions: Teach patient to arise slowly    Elimination Interventions: Toilet paper/wipes in reach    Pt. Received resting in bed with eyes open, NAD, respirations even and unlabored. Will continue q15 safety checks.

## 2020-05-22 NOTE — PROGRESS NOTES
Problem: Discharge Planning  Goal: *Discharge to safe environment  Outcome: Resolved/Met  Goal: *Knowledge of medication management  Outcome: Resolved/Met  Goal: *Knowledge of discharge instructions  Outcome: Resolved/Met

## 2020-05-22 NOTE — DISCHARGE SUMMARY
PSYCHIATRIC DISCHARGE SUMMARY    Patient: Kevin Rosenthal MRN: [id number]  SSN: [id number]    YOB: 1985  Age: 27 y.o. Sex: male        Date of Admission: 5/16/2020  Date of Discharge:5/22/2020      Type of Discharge:  REGULAR    Admission data:  History of Presenting Problem: Kevin Rosenthal is a 27year old male admitted to the [facility] after a medical admission for a suicide attempt via overdose of wellbutrin, prozac and lamictal. He was also drinking alcohol at the time. He has a history of Bipolar 1 Disorder. He started Wellbutrin about 2 weeks before the suicide attempt and feels that may have contributed to a change in mood. He feels his mood dropped all of a sudden. He is thankful to have survived the attempt. He was recently laid off due to COVID-19, he is making the same amount of money on unemployment but would like to be working. He does not drink alcohol everyday but is unable to stop drinking when he starts. He denies SI/HI/AH/VH.           Patient Active Problem List     Diagnosis Date Noted    [facility] ([facility] 12/05/2011       Priority: 1 - One       Class: Chronic    Bipolar disorder ([facility] 05/16/2020    Overdose 05/14/2020    Mood swings 06/10/2011    Self mutilation 06/10/2011           Past Medical History:   Diagnosis Date    Anxiety disorder      Bipolar affective disorder ([facility] 12/5/2011    Bipolar disorder ([facility]      Chronic pain       right foot and back pain    Depression      Mood disorder ([address]      Self mutilation       hx of cutting self    Substance abuse ([facility]      Suicidal thoughts        No past surgical history on file. No family history on file. Social History            Tobacco Use    Smoking status: Current Every Day Smoker       Packs/day: 1.00       Years: 15.00       Pack years: 15.00       Types: Cigarettes    Smokeless tobacco: Never Used   Substance Use Topics    Alcohol use:  Yes       Comment: Occasionally 10 beers a week            Prior to Admission medications    Medication Sig Start Date End Date Taking? Authorizing Provider   clonazePAM (KlonoPIN) 1 mg tablet Take 1 mg by mouth two (2) times a day.     Yes Provider, Historical   buPROPion XL (WELLBUTRIN XL) 150 mg tablet Take 150 mg by mouth daily.     Yes Provider, Historical   FLUoxetine (PROzac) 40 mg capsule Take 40 mg by mouth daily.     Yes Provider, Historical   lamoTRIgine (LaMICtal) 100 mg tablet Take 100 mg by mouth nightly.     Yes Provider, Historical           Allergies   Allergen Reactions    Ceclor [Cefaclor] Unknown (comments)            Objective:      Patient Vitals for the past 8 hrs:    BP Temp Pulse Resp SpO2   05/17/20 2011 124/73 98 °F (36.7 °C) 77 16 98 %   05/17/20 1529 112/76 97.7 °F (36.5 °C) 76 16 100 %         Mental Status exam: WNL         Impression:       Active Problems:    Bipolar disorder ([facility] (5/16/2020)      Hospital Course:    Patient was admitted to the Psychiatric services for acute psychiatric stabilization in regards to symptomatology as described in the HPI above and placed on Q15 minute checks and suicide precautions. He was started back on his usual medication regimen as well as PRN medications including lamictal. Lamictal was discontinued, he was started on depakote and seroquel and both were adjusted. While on the unit Barbara Sorenson was involved in individual, group, occupational and milieu therapy. He improved gradually and was able to integrate into the milieu with help from the nursing staff. Patients symptoms improved gradually including suicidal ideation, depression, poor sleep. He was appropriate in his interactions, and cooperative with medications and the unit routine. Please see individual progress notes for more specific details regarding patient's hospitalization course. Patient was discharged as per the plan. He had been doing well on the unit as per the report of the nursing staff and my observations.  No PRN medication for agitation, seclusion or restraints were required during the last 48 hours of his stay. Remigio Christine had improved progressively to the point of being stable for discharge and outpatient FU. At this time he did not offer any complaints. Patient denied any SI or HI. Denied any AH or VH. He denied any delusions. Was not considered a danger to self or to others and is safe for discharge. Will FU with his appointments and remains motivated to be in treatment. The patient verbalized understanding of his discharge instructions. Some parts of the discharge summary are from the initial Psychiatric interview that was done on admission by the admitting psychiatrist.       Allergies:(reviewed/updated 5/22/2020)  Allergies   Allergen Reactions    Ceclor [Cefaclor] Unknown (comments)       Side Effects: (reviewed/updated 5/22/2020)  None reported or admitted to. Vital Signs:  Patient Vitals for the past 24 hrs:   Temp Pulse Resp BP SpO2   05/22/20 0804 98.4 °F (36.9 °C) 84 16 95/56 99 %   05/21/20 2000 98.6 °F (37 °C) 85 16 120/79 99 %   05/21/20 1512 98.4 °F (36.9 °C) 93 16 115/75 99 %     Wt Readings from Last 3 Encounters:   05/17/20 63.5 kg (140 lb)   05/15/20 65 kg (143 lb 4.8 oz)   01/14/18 67.1 kg (148 lb)     Temp Readings from Last 3 Encounters:   05/22/20 98.4 °F (36.9 °C)   05/16/20 98.3 °F (36.8 °C)   02/04/20 98.2 °F (36.8 °C)     BP Readings from Last 3 Encounters:   05/22/20 95/56   05/16/20 126/80   02/04/20 111/72     Pulse Readings from Last 3 Encounters:   05/22/20 84   05/16/20 91   02/04/20 73       Labs: (reviewed/updated 5/22/2020)  Recent Results (from the past 24 hour(s))   VALPROIC ACID    Collection Time: 05/21/20  7:00 PM   Result Value Ref Range    Valproic acid 52 50 - 100 ug/ml     Lab Results   Component Value Date/Time    Valproic acid 52 05/21/2020 07:00 PM     No results found for: [facility]    Radiology (reviewed/updated 5/22/2020)  No results found.       Mental Status Exam on Discharge:  General appearance:   Eros Benton is a 27 y.o. WHITE OR  male who is well groomed, psychomotor activity is WNL  Eye contact: makes good eye contact  Speech: Spontaneous and coherent  Affect : Euthymic  Mood: \"OK\"  Thought Process: Logical, goal directed  Perception: Denies any AH or VH. Thought Content: Denies any SI or Plan  Insight: Partial  Judgement: Fair  Cognition: Intact grossly. Discharge Diagnosis:   Bipolar 1 disorder current episode depressed, without psychotic features. Discharge Medication List as of 5/22/2020 10:44 AM      START taking these medications    Details   nicotine (NICODERM CQ) 14 mg/24 hr patch 1 Patch by TransDERmal route every twenty-four (24) hours for 30 days. Indications: stop smoking, Normal, Disp-30 Patch, R-0      QUEtiapine (SEROquel) 100 mg tablet Take 1 Tab by mouth nightly. Indications: bipolar depression, Normal, Disp-30 Tab, R-0      divalproex ER (Depakote ER) 500 mg ER tablet Take 3 Tabs by mouth nightly. Indications: bipolar depression, Normal, Disp-90 Tab, R-0         STOP taking these medications       clonazePAM (KlonoPIN) 1 mg tablet Comments:   Reason for Stopping:         buPROPion XL (WELLBUTRIN XL) 150 mg tablet Comments:   Reason for Stopping:         FLUoxetine (PROzac) 40 mg capsule Comments:   Reason for Stopping:         lamoTRIgine (LaMICtal) 100 mg tablet Comments:   Reason for Stopping: Follow-up Information     Follow up With Specialties Details Why Contact Info    JONG [place]   telepsych for medication management [facility]  [address] [facility], [address] [place] [address]   On 5/26/2020 you have a 9:00 assessment - please bring you insurance card - ID and laptop should take about 2 hours  [address] [address]   [address]   ( to the left of the ER )  [address], [place]       None    None ([phone]) Patient stated that they have no PCP          WOUND CARE: none needed. Prognosis:   Good / [address] based on nature of patient's pathology/ies and treatment compliance issues. Prognosis is greatly dependent upon patient's ability to  follow up on psychiatric/psychotherapy appointments as well as to comply with psychiatric medications as prescribed. I certify that this patients inpatient psychiatric hospital services furnished since the previous certification were, and continue to be, required for treatment that could reasonably be expected to improve the patient's condition, or for diagnostic study, and that the patient continues to need, on a daily basis, active treatment furnished directly by or requiring the supervision of inpatient psychiatric facility personnel. In addition, the hospital records show that services furnished were intensive treatment services, admission or related services, or equivalent services.      Signed:  Hollis Steinberg NP  5/22/2020

## 2020-05-22 NOTE — BH NOTES
Behavioral Health Transition Record to Provider    Patient Name: Jiles Alpers  YOB: 1985  Medical Record Number: [id number]  Date of Admission: 5/16/2020  Date of Discharge: 5/22/2020    Attending Provider: No att. providers found  Discharging Provider: Shmuel Samson  To contact this individual call [phone] and ask the  to page. If unavailable, ask to be transferred to [facility] Provider on call. [facility] Provider will be available on call 24/7 and during holidays. Primary Care Provider: None    Allergies   Allergen Reactions    Ceclor [Cefaclor] Unknown (comments)       Reason for Admission: History of Presenting Radha Estrella is a 27year old male admitted to the [facility] after a medical admission for a suicide attempt via overdose of wellbutrin, prozac and lamictal. He was also drinking alcohol at the time. He has a history of Bipolar 1 Disorder. He started Wellbutrin about 2 weeks before the suicide attempt and feels that may have contributed to a change in mood. He feels his mood dropped all of a sudden. He is thankful to have survived the attempt. He was recently laid off due to COVID-19, he is making the same amount of money on unemployment but would like to be working. He does not drink alcohol everyday but is unable to stop drinking when he starts. He denies SI/HI/AH/VH. Admission Diagnosis: [facility] ([facility] [F31.9]    * No surgery found *    Results for orders placed or performed during the hospital encounter of 05/16/20   LIPID PANEL   Result Value Ref Range    LIPID PROFILE          Cholesterol, total 181 <200 MG/DL    Triglyceride 99 <150 MG/DL    HDL Cholesterol 56 MG/DL    LDL, calculated 105.2 (H) 0 - 100 MG/DL    VLDL, calculated 19.8 MG/DL    CHOL/HDL Ratio 3.2 0.0 - 5.0     VALPROIC ACID   Result Value Ref Range    Valproic acid 52 50 - 100 ug/ml       Immunizations administered during this encounter:    There is no immunization history on file for this patient. Screening for Metabolic Disorders for Patients on Antipsychotic Medications  (Data obtained from the EMR)    Estimated Body Mass Index  Estimated body mass index is 18.99 kg/m² as calculated from the following:    Height as of this encounter: 6' (1.829 m). Weight as of this encounter: 63.5 kg (140 lb). Vital Signs/Blood Pressure  Visit Vitals  BP 95/56   Pulse 84   Temp 98.4 °F (36.9 °C)   Resp 16   Ht 6' (1.829 m)   Wt 63.5 kg (140 lb)   SpO2 99%   BMI 18.99 kg/m²       Blood Glucose/Hemoglobin A1c  Lab Results   Component Value Date/Time    Glucose 113 (H) 05/16/2020 04:24 AM       No results found for: HBA1C, HGBE8, WYC4CDSE     Lipid Panel  Lab Results   Component Value Date/Time    Cholesterol, total 181 05/19/2020 06:21 AM    HDL Cholesterol 56 05/19/2020 06:21 AM    LDL, calculated 105.2 (H) 05/19/2020 06:21 AM    Triglyceride 99 05/19/2020 06:21 AM    CHOL/HDL Ratio 3.2 05/19/2020 06:21 AM        Discharge Diagnosis:               Discharge Plan: He will return home     The patient Re Sylvester exhibits the ability to control behavior in a less restrictive environment. Patient's level of functioning is improving. No assaultive/destructive behavior has been observed for the past 24 hours. No suicidal/homicidal threat or behavior has been observed for the past 24 hours. There is no evidence of serious medication side effects. Patient has not been in physical or protective restraints for at least the past 24 hours. If weapons involved, how are they secured? NA    Is patient aware of and in agreement with discharge plan? Yes    Arrangements for medication:  Prescriptions sent to his pharmacy. Copy of discharge instructions to provider?: [facility] attn: EZEQUIEL Green ([phone])DTR to [address] - [phone]  Arrangements for transportation home:  [place] to      Keep all follow up appointments as scheduled, continue to take prescribed medications per physician instructions. Mental health crisis number:  548 or your local mental health crisis line number at [phone]. Discharge Medication List and Instructions:   Discharge Medication List as of 5/22/2020 10:44 AM      START taking these medications    Details   nicotine (NICODERM CQ) 14 mg/24 hr patch 1 Patch by TransDERmal route every twenty-four (24) hours for 30 days. Indications: stop smoking, Normal, Disp-30 Patch, R-0      QUEtiapine (SEROquel) 100 mg tablet Take 1 Tab by mouth nightly. Indications: bipolar depression, Normal, Disp-30 Tab, R-0      divalproex ER (Depakote ER) 500 mg ER tablet Take 3 Tabs by mouth nightly. Indications: bipolar depression, Normal, Disp-90 Tab, R-0         STOP taking these medications       clonazePAM (KlonoPIN) 1 mg tablet Comments:   Reason for Stopping:         buPROPion XL (WELLBUTRIN XL) 150 mg tablet Comments:   Reason for Stopping:         FLUoxetine (PROzac) 40 mg capsule Comments:   Reason for Stopping:         lamoTRIgine (LaMICtal) 100 mg tablet Comments:   Reason for Stopping:               Unresulted Labs (24h ago, onward)    None        To obtain results of studies pending at discharge, please contact [phone]    Follow-up Information     Follow up With Specialties Details Why Contact Info    JONG Nath   telepsych for medication management [address] [place] [address] Perez Mendez  [facility], [address] [place] [address]   On 5/26/2020 you have a 9:00 assessment - please bring you insurance card - ID and laptop should take about 2 hours  [address] [facility] [address]   [address]   ( to the left of the ER )  [place], [place]       None    None ([phone]) Patient stated that they have no PCP            Advanced Directive:   Does the patient have an appointed surrogate decision maker? No  Does the patient have a Medical Advance Directive? No  Does the patient have a Psychiatric Advance Directive? No  If the patient does not have a surrogate or Medical Advance Directive AND Psychiatric Advance Directive, the patient was offered information on these advance directives Patient declined to complete    Patient Instructions: Please continue all medications until otherwise directed by physician. Tobacco Cessation Discharge Plan:   Is the patient a smoker and needs referral for smoking cessation? Yes  Patient referred to the following for smoking cessation with an appointment? Yes   -Karyn Haas NP   Patient was offered medication to assist with smoking cessation at discharge? Yes  Was education for smoking cessation added to the discharge instructions? yes    Alcohol/Substance Abuse Discharge Plan:   Does the patient have a history of substance/alcohol abuse and requires a referral for treatment? Yes  Patient referred to the following for substance/alcohol abuse treatment with an appointment? yes  Patient was offered medication to assist with alcohol cessation at discharge? no  Was education for substance/alcohol abuse added to discharge instructions? no    Patient discharged to Home; discussed with patient/caregiver and provided to the patient/caregiver either in hard copy or electronically.

## 2020-05-22 NOTE — PROGRESS NOTES
Laboratory Monitoring for Divalproex/Valproic Acid: This patient is currently prescribed the following medication(s):   Current Facility-Administered Medications   Medication Dose Route Frequency    QUEtiapine (SEROquel) tablet 100 mg  100 mg Oral QHS    divalproex ER (DEPAKOTE ER) 24 hour tablet 1,000 mg  1,000 mg Oral QHS    nicotine (NICODERM CQ) 21 mg/24 hr patch 1 Patch  1 Patch TransDERmal DAILY     The following labs have been completed for monitoring of valproic acid:    Valproic Acid Serum Concentration  Lab Results   Component Value Date/Time    Valproic acid 52 05/21/2020 07:00 PM       Hepatic Function  Lab Results   Component Value Date/Time    Bilirubin, total 0.6 05/16/2020 04:24 AM    Protein, total 6.8 05/16/2020 04:24 AM    Albumin 3.4 (L) 05/16/2020 04:24 AM    Globulin 3.4 05/16/2020 04:24 AM    A-G Ratio 1.0 (L) 05/16/2020 04:24 AM    ALT (SGPT) 16 05/16/2020 04:24 AM    Alk. phosphatase 49 05/16/2020 04:24 AM     Hematology  Lab Results   Component Value Date/Time    WBC 7.6 05/16/2020 04:24 AM    RBC 4.24 05/16/2020 04:24 AM    HGB 12.9 05/16/2020 04:24 AM    HCT 38.7 05/16/2020 04:24 AM    MCV 91.3 05/16/2020 04:24 AM    MCH 30.4 05/16/2020 04:24 AM    MCHC 33.3 05/16/2020 04:24 AM    RDW 12.8 05/16/2020 04:24 AM    PLATELET 959 03/76/4149 04:24 AM     Assessment/Plan:  A valproic acid level was drawn on 5/21 @ 1900 and found to be 52 mcg/mL. This level is drawn prior to the 4th dose. Based on this level, recommend increasing to 1500mg ER QHS upon discharge today.      Wanda Moreno, PharmD, BCPP, [facility] Specialist, [facility]

## 2020-05-22 NOTE — INTERDISCIPLINARY ROUNDS
Behavioral Health Interdisciplinary Rounds     Patient Name: Latoya Joshi  Age: 27 y.o. Room/Bed:  [facility]/  Primary Diagnosis: <principal problem not specified>   Admission Status: Voluntary     Readmission within 30 days: no  Power of  in place: no  Patient requires a blocked bed: no          Reason for blocked bed:     VTE Prophylaxis: No    Mobility needs/Fall risk: no  Flu Vaccine : no   Nutritional Plan: no  Consults:          Labs/Testing due today?: no    Sleep hours:  6      Participation in Care/Groups:  yes  Medication Compliant?: Yes  PRNS (last 24 hours): None    Restraints (last 24 hours):  no     CIWA (range last 24 hours): CIWA-Ar Total: 0   COWS (range last 24 hours):      Alcohol screening (AUDIT) completed -   AUDIT Score: 16     If applicable, date SBIRT discussed in treatment team AND documented:   AUDIT Screen Score: AUDIT Score: 16      Tobacco - patient is a smoker: Have You Used Tobacco in the Past 30 Days: Yes  Illegal Drugs use: Have You Used Any Illegal Substances Over the Past 12 Months: No    24 hour chart check complete: yes     Patient goal(s) for today:   Treatment team focus/goals: Plan to discharge today. Progress note : Plan to discharge today. LOS:  6  Expected LOS: Today    Financial concerns/prescription coverage:  [employer]   Family contact:      Family requesting physician contact today:    Discharge plan: he will return home   Access to weapons :  no       Outpatient provider(s): Alem Vee NP - [facility] at [address]   Patient's preferred phone number for follow up call :     Participating treatment team members: Karen Padron NP - Jojo Talbot RN - [employer].

## 2021-01-01 NOTE — PROGRESS NOTES
NICU Discharge Summary    Patient:  Modesto Damico  Date of birth / time of birth:  2022 12:42 PM 7:10 AM  Date of admission:  21  Gestational age at birth:  Gestational Age: 35w5d  Birth weight: 5 lb 10.7 oz (2570 g)  Corrected gestational age at discharge:  37w 3d  Day of Life:  12 days  Discharge Date:  21    Mother:  Zulma Alfaro  Phone Number  [phone]    Father:  Alanna      Narrative Summary:  Modesto Damico is a Gestational Age: 35w5d male infant born on 2022 at 7:10 AM weighing 5 lb 10.7 oz (2570 g) with Apgars of 8 at one minute and 8  at five minutes via Primary  section for breech presentation of twin A.  He was born to a   Information for the patient's mother:  Zulma Alfaro [[id number]]   25 year old       mother with a pregnancy that was complicated by multiple gestation, di-di twins. Infant was transferred from [facility] to [facility] due to prematurity and respiratory distress.      Infant received 3 doses of Curosurf.  Received 7 day course of antibiotics for presumed pneumonia.  Prolonged stay until infant mastered nippling skills    Discharge Diagnoses:  Active Problems:     twin , mate liveborn, delivered by  during current hospitalization, 2,500 grams and over, 35-36 completed weeks  Resolved Problems:    RDS (respiratory distress syndrome in the )    Discharge diet NPO    Discharge Medications:   Infant received Vitamin K , erythromycin and Hep B vaccine on 21      We can be reached with any questions regarding this patient's NICU course at   [phone].      Pregnancy and Delivery History    Prenatal labs include:  Information for the patient's mother:  Zulma Alfaro [[id number]]     Recent Labs   Lab 21  1059   HIV Antigen/ Antibody Combo Screen Nonreactive   Hepatitis B Surface Antigen Negative   Treponema Pallidum Antibody, IgG and IgM Nonreactive   Rubella Antibody, IgG >500.0     Laboratory Monitoring for Antipsychotics: This patient is currently prescribed the following medication(s):   Current Facility-Administered Medications   Medication Dose Route Frequency    divalproex ER (DEPAKOTE ER) 24 hour tablet 1,000 mg  1,000 mg Oral QHS    QUEtiapine (SEROquel) tablet 50 mg  50 mg Oral QHS    nicotine (NICODERM CQ) 21 mg/24 hr patch 1 Patch  1 Patch TransDERmal DAILY     The following labs have been completed for monitoring of antipsychotics and/or mood stabilizers:    Height, Weight, BMI Estimation  Estimated body mass index is 18.99 kg/m² as calculated from the following:    Height as of this encounter: 182.9 cm (72\"). Weight as of this encounter: 63.5 kg (140 lb). Vital Signs/Blood Pressure  Visit Vitals  /75   Pulse 83   Temp 98.1 °F (36.7 °C)   Resp 16   Ht 182.9 cm (72\")   Wt 63.5 kg (140 lb)   SpO2 98%   BMI 18.99 kg/m²     Renal Function, Hepatic Function and Chemistry  Estimated Creatinine Clearance: 112.9 mL/min (by C-G formula based on SCr of 0.82 mg/dL). Lab Results   Component Value Date/Time    Sodium 138 05/16/2020 04:24 AM    Potassium 3.5 05/16/2020 04:24 AM    Chloride 105 05/16/2020 04:24 AM    CO2 25 05/16/2020 04:24 AM    Anion gap 8 05/16/2020 04:24 AM    BUN 6 05/16/2020 04:24 AM    Creatinine 0.82 05/16/2020 04:24 AM    BUN/Creatinine ratio 7 (L) 05/16/2020 04:24 AM    Bilirubin, total 0.6 05/16/2020 04:24 AM    Protein, total 6.8 05/16/2020 04:24 AM    Albumin 3.4 (L) 05/16/2020 04:24 AM    Globulin 3.4 05/16/2020 04:24 AM    A-G Ratio 1.0 (L) 05/16/2020 04:24 AM    ALT (SGPT) 16 05/16/2020 04:24 AM    Alk.  phosphatase 49 05/16/2020 04:24 AM     Lab Results   Component Value Date/Time    Glucose 113 (H) 05/16/2020 04:24 AM     No results found for: HBA1C, HGBE8, MYX3EVCV    Hematology  Lab Results   Component Value Date/Time    WBC 7.6 05/16/2020 04:24 AM    RBC 4.24 05/16/2020 04:24 AM    HGB 12.9 05/16/2020 04:24 AM    HCT 38.7 05/16/2020 04:24 AM   Information for the patient's mother:  Zulma Alfaro [[id number]]   No results found for: SYPIGG     No results found for this or any previous visit.  Herpes: Unknown     A+, HIV -, treponema non reactive, rubella immune, hep b negative, hep c negative    Pregnancy complications:   multiple gestation, GBS + 21  Rapid SARS negative on 21    Maternal history includes:   Information for the patient's mother:  Zulma Alfaro [[id number]]   25 year old     female  Information for the patient's mother:  Zulma Alfaro [[id number]]        Information for the patient's mother:  Zulma Alfaro [[id number]]     Past Medical History:   Diagnosis Date   • No pertinent past medical history       Information for the patient's mother:  Zulma Alfaro [[id number]]     Social History     Tobacco Use   • Smoking status: Never Smoker   • Smokeless tobacco: Never Used   Vaping Use   • Vaping Use: Never assessed   Substance Use Topics   • Alcohol use: No   • Drug use: No      Information for the patient's mother:  Zulma Alfaro [[id number]]     No medications prior to admission.      Antibiotics doses prior to delivery: No    Labor  Delivery Method: , Low Transverse [251]  Rupture Date: 2021  Rupture Time: 10:30 PM  Time of Birth: 7:10 AM    Resuscitation and care at [place]:     Per Dr. Lerma H&P note:  Patient is a male  infant born via  delivery to his 25 year old   mother with EDC 21.  Pregnancy was complicated by di-di twin gestation. Mom presented to L&D on the morning of  with ROM. Because infant A was breech,  was performed. ROM was 9 hours. Mom was GBS positive and no antibiotics were given (except standard  antibiotics immediately prior to delivery). Apgars were 8 and 8. Infant cried at birth. Tactile stimulation and drying was performed.  Infant appeared dusky at 4 minutes of life and oxygen saturation was about 70% at 5 minutes of  MCV 91.3 05/16/2020 04:24 AM    MCH 30.4 05/16/2020 04:24 AM    MCHC 33.3 05/16/2020 04:24 AM    RDW 12.8 05/16/2020 04:24 AM    PLATELET 661 18/35/5560 04:24 AM     Lipids  No results found for: CHOL, CHOLX, CHLST, CHOLV, 559692, HDL, HDLP, LDL, LDLC, DLDLP, TGLX, TRIGL, TRIGP, CHHD, CHHDX    Thyroid Function  Lab Results   Component Value Date/Time    TSH 0.73 10/29/2010 09:43 PM     Assessment/Plan:  Will order lipid panel to complete the recommended baseline laboratory monitoring based on the patient's current medication regimen.         KRIS PerdueD life. Infant had some nasal flaring and retractions. CPAP 5 50% was started. Oxygen increased to 90% and FiO2 was quickly weaned to FiO2 21%. Infant continued to have nasal flaring, and grunting. His saturations ranged from 91-95%. FiO2 was increased to 25%. His saturations improved to the mid 90s. After 1 hour of CPAP, he was given a trial off respiratory support and his oxygen fell to the mid 80s, he had increased grunting, nasal flaring and retractions. CPAP 5 FiO2 25% was continued. CXR showed bilateral groundglass opacities consistent with RDS. PIV was placed. Glucose was 64 and infant was started on D10W at 60 ml/kg/day. CBC and blood culture were done. Capillary gas was pH 7.22, pCO2 60 HCO3 24. CBC showed WBC 11 with 24% neuts and 6% bands. Blood pressure had a MAP of 39. Ampicillin and gentamicin were given. Given continued respiratory distress, NICU was called. [place] accepted for transfer.     Apgars 1 and 5 minutes:      Birth Measurements:  Weight: 5 lb 10.7 oz (2570 g)  Length: 18.5\"  Head circumference: 34 cm      NICU Course     NICU Procedures: ET vent, nasal cannula, NCPAP and PIV ,      Respiratory: Infant admitted on LARS CPAP +6, 24% FiO2.  CXR consistent with RDS/TTN.  Infant intubated and given curosurf-dose 1.  Remained on ventilator for 3 hours then weaned to CPAP.  The infant was reintubated the next morning for increased work of breathing and a very granular pattern to the lungs. Received 2 more doses of Curosurf.  Weaned to RA .    Cardiovascular: Hemodynamically stable       Fluids/Electrolytes/Nutrition and Gastrointestinal:  At birth the patient was AGA.       UAC and UVC placed 21; Day 2 of life.              Infectious Disease:   Sepsis: ruled out  Risk factors:      Maternal GBS status: positive; adequately treated No     Chorioamnionitis:  No     Prolonged rupture of membranes:  No      labor:  Yes     Blood culture drawn:  Yes   Date: 21 and 21   Results: negative     Antibiotic therapy with:  ampicillin  3 doses and gentamicin  (one dose) , 7 day course initiated 6/8     Dates of antibiotic therapy: 6/5-6/6, 6/8-6/15    6/7/21:  Repeat blood culture sent, 6/7/21 ETT culture sent-no growth, Pro calcitonin unremarkable    Hematologic:   Maternal blood type:   Information for the patient's mother:  Zulma Alfaro [[id number]]   A Rh Positive    Hyperbilirubinemia: no  Dates of phototherapy:  NA   Most recent bilirubin level prior to discharge:   Bilirubin, Total (mg/dL)   Date Value   2021 6.8 (H)       Anemia: Yes   Most recent hgb/hematocrit prior to discharge:   HGB (g/dL)   Date Value   2021 14.8     HCT (%)   Date Value   2021 40.8 (L)       CNS: Normal neurological examination during stay in NICU, was sedated with morphine during mechanical ventilation         Social: Parents not , twins are their first babies.  Twin girl discharged home on DOL 3 from [place].      Discharge Physical Exam    Discharge weight:  2604 g (06/17/21 0130)   Discharge length:  19\" (48.3 cm) (06/14/21 0000)  Discharge head circumference:  33 cm (12.99\") (06/14/21 0000)  Vital signs at discharge:    Visit Vitals  BP 76/40 (BP Location: LLE - Left lower extremity)   Pulse 144   Temp 98.1 °F (36.7 °C) (Axillary)   Resp 55   Ht 19\" (48.3 cm)   Wt 2604 g   HC 33 cm (12.99\")   SpO2 100%   BMI 11.18 kg/m²     GENERAL:  Alert, vigorous male with appropriate behavior. He is in no acute distress.  SKIN: His skin is warm with normal turgor. The color of the skin is pale  pink. There is no significant birthmark or rash. There are no bruises or other signs of injury.  No significant jaundice.  HEAD: The head is atraumatic and normocephalic. The anterior fontanel is open and flat.  EYES: Opens both eyes equally. Normal conjunctivae, no eye drainage noted.  Red reflex present and symmetrical bilaterally.  EARS: Pinnae and external ear canals normal.  NOSE: There is  no nasal flaring, nares patent bilaterally.  THROAT:  The oropharynx is normal.  There is no cleft of the palate.  NECK: Clavicles without crepitus.  TRUNK AND THORAX: There are no lesions on the trunk; there is no dimple  over the presacral area. There are no retractions.  LUNGS: The lung fields are clear to auscultation.  HEART: The precordium is quiet. The heart rhythm is  Abnormal with PAC's  There are no murmurs. The femoral pulses are normal. No edema.  ABDOMEN: The umbilical cord stump is normal.  There is not an umbilical hernia. The abdomen Soft.   GENITALIA: normal male genitalia with bilateral descended testes   RECTAL: Anus patent  EXTREMITIES: Moving all 4 extremities. The hip exam is normal. There are no hip clicks or clunks.    NEUROLOGIC: he displays normal tone throughout. He is not jittery and he has normal  reflexes. Jcarlos reflex present. Normal tone for gestational age     Discharge Planning   Hearing Screen:    Hearing Test Results: Pass R;Pass L (06/15/21 0350)  [facility] Screen: - normal  Pulse Oximetry Congenital Heart Disease (CHD) Screen: Normal (ECHO also done)ECHO done 21 No congenital heart disease , PACs noted,  no PDA, no PPHM  noted  Immunizations:   Immunization History   Administered Date(s) Administered   • Hep B, adolescent or pediatric 2021     Circumcision declined.    Discharged home with parents.    The primary care provider for this infant was notified of patient's discharge and this discharge summary has been routed to primary care provider.   Patient seen and examined  Agree with above  Lungs CTA  RRR without murmur  abd soft  Stable in RA  Tolerate feeds   Discharge home  Franco Collins MD

## 2021-03-29 ENCOUNTER — HOSPITAL ENCOUNTER (EMERGENCY)
Age: 36
Discharge: HOME | End: 2021-03-29
Attending: STUDENT IN AN ORGANIZED HEALTH CARE EDUCATION/TRAINING PROGRAM
Payer: COMMERCIAL

## 2021-03-29 VITALS
HEART RATE: 91 BPM | DIASTOLIC BLOOD PRESSURE: 85 MMHG | TEMPERATURE: 97.2 F | OXYGEN SATURATION: 97 % | RESPIRATION RATE: 16 BRPM | SYSTOLIC BLOOD PRESSURE: 119 MMHG

## 2021-03-29 DIAGNOSIS — T50.901A ACCIDENTAL OVERDOSE, INITIAL ENCOUNTER: Primary | ICD-10-CM

## 2021-03-29 PROCEDURE — 99284 EMERGENCY DEPT VISIT MOD MDM: CPT

## 2021-03-29 PROCEDURE — 96374 THER/PROPH/DIAG INJ IV PUSH: CPT

## 2021-03-29 PROCEDURE — 74011250636 HC RX REV CODE- 250/636: Performed by: STUDENT IN AN ORGANIZED HEALTH CARE EDUCATION/TRAINING PROGRAM

## 2021-03-29 RX ORDER — KETOROLAC TROMETHAMINE 30 MG/ML
30 INJECTION, SOLUTION INTRAMUSCULAR; INTRAVENOUS
Status: DISCONTINUED | OUTPATIENT
Start: 2021-03-29 | End: 2021-03-29

## 2021-03-29 RX ORDER — KETOROLAC TROMETHAMINE 30 MG/ML
30 INJECTION, SOLUTION INTRAMUSCULAR; INTRAVENOUS
Status: COMPLETED | OUTPATIENT
Start: 2021-03-29 | End: 2021-03-29

## 2021-03-29 RX ADMIN — KETOROLAC TROMETHAMINE 30 MG: 30 INJECTION, SOLUTION INTRAMUSCULAR at 22:06
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
## 2021-03-30 NOTE — ED TRIAGE NOTES
Triage: Pt arrives via EMS after an accidental overdose. He admits to heroin use. EMS found the pt unresponsive and initially administered . 25mg intranasally x2 and .5mg IV which caused the patient to wake. He arrives A+Ox4 with RA sats of 96%. Denies complaints. Never

## 2021-03-30 NOTE — ED NOTES
.MD reviewed discharge instructions and options with patient and patient verbalized understanding. RN reviewed discharge instructions using teachback method. Pt ambulated to exit without difficulty and in no signs of acute distress escorted by [employer], and they  will drive home. No complaints or needs expressed at this time. Patient was counseled on medications prescribed at discharge. VSS, verbalized relief from most intense pain. Patient to call his PCP in the morning for appointment.

## 2021-06-05 ENCOUNTER — HOSPITAL ENCOUNTER (EMERGENCY)
Age: 36
Discharge: HOME | End: 2021-06-05
Attending: EMERGENCY MEDICINE
Payer: COMMERCIAL

## 2021-06-05 VITALS
OXYGEN SATURATION: 100 % | HEART RATE: 92 BPM | BODY MASS INDEX: 20.32 KG/M2 | WEIGHT: 150 LBS | RESPIRATION RATE: 16 BRPM | DIASTOLIC BLOOD PRESSURE: 82 MMHG | TEMPERATURE: 98.4 F | SYSTOLIC BLOOD PRESSURE: 118 MMHG | HEIGHT: 72 IN

## 2021-06-05 DIAGNOSIS — G43.809 OTHER MIGRAINE WITHOUT STATUS MIGRAINOSUS, NOT INTRACTABLE: Primary | ICD-10-CM

## 2021-06-05 LAB
ANION GAP SERPL CALC-SCNC: 1 MMOL/L (ref 5–15)
BASOPHILS # BLD: 0.1 K/UL (ref 0–0.1)
BASOPHILS NFR BLD: 1 % (ref 0–1)
BUN SERPL-MCNC: 11 MG/DL (ref 6–20)
BUN/CREAT SERPL: 12 (ref 12–20)
CALCIUM SERPL-MCNC: 9.3 MG/DL (ref 8.5–10.1)
CHLORIDE SERPL-SCNC: 107 MMOL/L (ref 97–108)
CO2 SERPL-SCNC: 31 MMOL/L (ref 21–32)
CREAT SERPL-MCNC: 0.94 MG/DL (ref 0.7–1.3)
DATE LAST DOSE: ABNORMAL
DIFFERENTIAL METHOD BLD: NORMAL
EOSINOPHIL # BLD: 0.3 K/UL (ref 0–0.4)
EOSINOPHIL NFR BLD: 4 % (ref 0–7)
ERYTHROCYTE [DISTWIDTH] IN BLOOD BY AUTOMATED COUNT: 12.2 % (ref 11.5–14.5)
GLUCOSE SERPL-MCNC: 98 MG/DL (ref 65–100)
HCT VFR BLD AUTO: 44.8 % (ref 36.6–50.3)
HGB BLD-MCNC: 14.3 G/DL (ref 12.1–17)
IMM GRANULOCYTES # BLD AUTO: 0 K/UL (ref 0–0.04)
IMM GRANULOCYTES NFR BLD AUTO: 0 % (ref 0–0.5)
LITHIUM SERPL-SCNC: 0.22 MMOL/L (ref 0.6–1.2)
LYMPHOCYTES # BLD: 2.9 K/UL (ref 0.8–3.5)
LYMPHOCYTES NFR BLD: 38 % (ref 12–49)
MCH RBC QN AUTO: 28.8 PG (ref 26–34)
MCHC RBC AUTO-ENTMCNC: 31.9 G/DL (ref 30–36.5)
MCV RBC AUTO: 90.1 FL (ref 80–99)
MONOCYTES # BLD: 0.8 K/UL (ref 0–1)
MONOCYTES NFR BLD: 11 % (ref 5–13)
NEUTS SEG # BLD: 3.5 K/UL (ref 1.8–8)
NEUTS SEG NFR BLD: 46 % (ref 32–75)
NRBC # BLD: 0 K/UL (ref 0–0.01)
NRBC BLD-RTO: 0 PER 100 WBC
PLATELET # BLD AUTO: 320 K/UL (ref 150–400)
PMV BLD AUTO: 9.6 FL (ref 8.9–12.9)
POTASSIUM SERPL-SCNC: 4.2 MMOL/L (ref 3.5–5.1)
RBC # BLD AUTO: 4.97 M/UL (ref 4.1–5.7)
REPORTED DOSE,DOSE: ABNORMAL UNITS
REPORTED DOSE/TIME,TMG: ABNORMAL
SODIUM SERPL-SCNC: 139 MMOL/L (ref 136–145)
WBC # BLD AUTO: 7.6 K/UL (ref 4.1–11.1)

## 2021-06-05 PROCEDURE — 96375 TX/PRO/DX INJ NEW DRUG ADDON: CPT

## 2021-06-05 PROCEDURE — 80048 BASIC METABOLIC PNL TOTAL CA: CPT

## 2021-06-05 PROCEDURE — 74011250636 HC RX REV CODE- 250/636: Performed by: NURSE PRACTITIONER

## 2021-06-05 PROCEDURE — 99283 EMERGENCY DEPT VISIT LOW MDM: CPT

## 2021-06-05 PROCEDURE — 36415 COLL VENOUS BLD VENIPUNCTURE: CPT

## 2021-06-05 PROCEDURE — 74011000250 HC RX REV CODE- 250: Performed by: NURSE PRACTITIONER

## 2021-06-05 PROCEDURE — 80178 ASSAY OF LITHIUM: CPT

## 2021-06-05 PROCEDURE — 85025 COMPLETE CBC W/AUTO DIFF WBC: CPT

## 2021-06-05 PROCEDURE — 96374 THER/PROPH/DIAG INJ IV PUSH: CPT

## 2021-06-05 RX ORDER — DIPHENHYDRAMINE HYDROCHLORIDE 50 MG/ML
25 INJECTION, SOLUTION INTRAMUSCULAR; INTRAVENOUS
Status: COMPLETED | OUTPATIENT
Start: 2021-06-05 | End: 2021-06-05

## 2021-06-05 RX ORDER — LORATADINE 10 MG/1
10 TABLET ORAL
COMMUNITY

## 2021-06-05 RX ORDER — DEXAMETHASONE SODIUM PHOSPHATE 10 MG/ML
10 INJECTION INTRAMUSCULAR; INTRAVENOUS ONCE
Status: COMPLETED | OUTPATIENT
Start: 2021-06-05 | End: 2021-06-05

## 2021-06-05 RX ORDER — LITHIUM CARBONATE 300 MG/1
300 CAPSULE ORAL DAILY
COMMUNITY

## 2021-06-05 RX ORDER — ACETAMINOPHEN 500 MG
1000 TABLET ORAL
COMMUNITY
End: 2021-08-20

## 2021-06-05 RX ORDER — BUTALBITAL, ACETAMINOPHEN AND CAFFEINE 300; 40; 50 MG/1; MG/1; MG/1
1 CAPSULE ORAL
Qty: 20 CAPSULE | Refills: 0 | Status: SHIPPED | OUTPATIENT
Start: 2021-06-05 | End: 2021-06-10

## 2021-06-05 RX ORDER — KETOROLAC TROMETHAMINE 30 MG/ML
30 INJECTION, SOLUTION INTRAMUSCULAR; INTRAVENOUS
Status: COMPLETED | OUTPATIENT
Start: 2021-06-05 | End: 2021-06-05

## 2021-06-05 RX ORDER — TETRACAINE HYDROCHLORIDE 5 MG/ML
1 SOLUTION OPHTHALMIC
Status: COMPLETED | OUTPATIENT
Start: 2021-06-05 | End: 2021-06-05

## 2021-06-05 RX ORDER — FLUOXETINE HYDROCHLORIDE 20 MG/1
20 CAPSULE ORAL DAILY
COMMUNITY
End: 2022-04-22 | Stop reason: ALTCHOICE

## 2021-06-05 RX ADMIN — PROCHLORPERAZINE EDISYLATE 10 MG: 5 INJECTION INTRAMUSCULAR; INTRAVENOUS at 18:42

## 2021-06-05 RX ADMIN — DIPHENHYDRAMINE HYDROCHLORIDE 25 MG: 50 INJECTION, SOLUTION INTRAMUSCULAR; INTRAVENOUS at 17:45

## 2021-06-05 RX ADMIN — TETRACAINE HYDROCHLORIDE 1 DROP: 5 SOLUTION OPHTHALMIC at 17:46

## 2021-06-05 RX ADMIN — DEXAMETHASONE SODIUM PHOSPHATE 10 MG: 10 INJECTION, SOLUTION INTRAMUSCULAR; INTRAVENOUS at 17:46

## 2021-06-05 RX ADMIN — KETOROLAC TROMETHAMINE 30 MG: 30 INJECTION, SOLUTION INTRAMUSCULAR at 17:45

## 2021-06-05 RX ADMIN — SODIUM CHLORIDE 1000 ML: 9 INJECTION, SOLUTION INTRAVENOUS at 17:45

## 2021-06-05 NOTE — ED TRIAGE NOTES
Pt ambulatory to ED with c/o headache to right side of head and over right eye weekly x 3 weeks. Pt reports photophobia and being sensitive to noise. Denies N/V. Pt reports \"I got my last Covid vaccine on 4/29. I got this headache after both vaccines\".

## 2021-06-05 NOTE — ED PROVIDER NOTES
This is a 25-year-old male with past medical history of anxiety, bipolar disorder, and prior substance abuse who presents the ER today for evaluation of headache. Patient states that he has been experiencing cyclical headaches for approximately the last 3 weeks in duration: \"It is weird, it is like I have gotten a headache every Thursday for the last 3 weeks that gets pretty intense and it goes away. Had a headache this Thursday and it went away, but it started to come back today. Been taking Tylenol and Advil and it has not touched the pain\". Constitutional symptoms include a sensation of nausea, phonophobia, fatigue, and photophobia. He articulates the pain as being in the right forehead as well as in the right parietal region of his head. He has difficulty describing the characteristics of the pain, \"but it does not feel like it is throbbing\". Patient denies any formal diagnosis of headache disorder and denies any recent head injury/trauma. Further questioning reveals that the patient was inadvertently taking supratherapeutic doses of his lithium at around the time that his headache started (has since then lowered his lithium dose to its normal prescribed amount). He denies any acute visual disturbances. Past Medical History:   Diagnosis Date    Anxiety disorder     Bipolar affective disorder ([address] 12/5/2011    Bipolar disorder ([address]     Chronic pain     right foot and back pain    Depression     Mood disorder ([address]     Self mutilation     hx of cutting self    Substance abuse ([address]     Suicidal thoughts        History reviewed. No pertinent surgical history. History reviewed. No pertinent family history.     Social History     Socioeconomic History    Marital status: SINGLE     Spouse name: Not on file    Number of children: Not on file    Years of education: Not on file    Highest education level: Not on file   Occupational History    Not on file   Tobacco Use    Smoking status: Current Every Day Smoker     Packs/day: 1.00     Years: 15.00     Pack years: 15.00     Types: Cigarettes    Smokeless tobacco: Never Used   Substance and Sexual Activity    Alcohol use: Yes     Comment: Occasionally 10 beers a week    Drug use: No    Sexual activity: Yes     Partners: Female     Birth control/protection: None   Other Topics Concern     Service Not Asked    Blood Transfusions Not Asked    Caffeine Concern Not Asked    Occupational Exposure Not Asked    Hobby Hazards Not Asked    Sleep Concern Not Asked    Stress Concern Not Asked    Weight Concern Not Asked    Special Diet Not Asked    Back Care Not Asked    Exercise Not Asked    Bike Helmet Not Asked    Seat Belt Not Asked    Self-Exams Not Asked   Social History Narrative    Not on file     Social Determinants of Health     Financial Resource Strain:     Difficulty of Paying Living Expenses:    Food Insecurity:     Worried About Running Out of Food in the Last Year:     Ran Out of Food in the Last Year:    Transportation Needs:     Lack of Transportation (Medical):  Lack of Transportation (Non-Medical):    Physical Activity:     Days of Exercise per Week:     Minutes of Exercise per Session:    Stress:     Feeling of Stress :    Social Connections:     Frequency of Communication with Friends and Family:     Frequency of Social Gatherings with Friends and Family:     Attends Islam Services:     Active Member of Clubs or Organizations:     Attends Club or Organization Meetings:     Marital Status:    Intimate Partner Violence:     Fear of Current or Ex-Partner:     Emotionally Abused:     Physically Abused:     Sexually Abused: ALLERGIES: Ceclor [cefaclor]    Review of Systems   Constitutional: Positive for fatigue. Negative for fever. HENT: Negative for sore throat. Eyes: Positive for photophobia. Negative for visual disturbance.    Respiratory: Negative for shortness of breath. Cardiovascular: Negative for palpitations. Gastrointestinal: Negative for vomiting. Genitourinary: Negative for dysuria. Musculoskeletal: Negative for myalgias. Skin: Negative for rash. Neurological: Positive for headaches. Negative for dizziness. Psychiatric/Behavioral: Negative for dysphoric mood. Vitals:    06/05/21 1618   BP: 120/81   Pulse: 87   Resp: 15   Temp: 98.7 °F (37.1 °C)   SpO2: 99%   Weight: 68 kg (150 lb)   Height: 6' (1.829 m)            Physical Exam  Vitals and nursing note reviewed. Constitutional:       General: He is in acute distress. Appearance: Normal appearance. He is not ill-appearing. HENT:      Head: Normocephalic and atraumatic. Nose: Nose normal.      Mouth/Throat:      Mouth: Mucous membranes are moist.      Pharynx: Oropharynx is clear. Eyes:      General: Lids are normal. Vision grossly intact. Intraocular pressure: Right eye pressure is 18 mmHg. Left eye pressure is 18 mmHg. Extraocular Movements: Extraocular movements intact. Cardiovascular:      Rate and Rhythm: Normal rate and regular rhythm. Pulses: Normal pulses. Heart sounds: Normal heart sounds. Pulmonary:      Effort: Pulmonary effort is normal.      Breath sounds: Normal breath sounds. Abdominal:      General: Bowel sounds are normal.      Palpations: Abdomen is soft. Musculoskeletal:         General: Normal range of motion. Cervical back: Normal range of motion and neck supple. Skin:     General: Skin is warm and dry. Neurological:      General: No focal deficit present. Mental Status: He is alert and oriented to person, place, and time. Mental status is at baseline. Cranial Nerves: No dysarthria. Motor: Motor function is intact. Coordination: Coordination is intact. Gait: Gait is intact.    Psychiatric:         Mood and Affect: Mood normal.         Behavior: Behavior normal.          MDM      VITAL SIGNS:  Patient Vitals for the past 4 hrs:   Temp Pulse Resp BP SpO2   06/05/21 1618 98.7 °F (37.1 °C) 87 15 120/81 99 %         LABS:  Recent Results (from the past 6 hour(s))   LITHIUM    Collection Time: 06/05/21  5:04 PM   Result Value Ref Range    Lithium level 0.22 (L) 0.60 - 1.20 MMOL/L    Reported dose date NOT PROVIDED      Reported dose time: NOT PROVIDED      Reported dose: NOT PROVIDED UNITS   CBC WITH AUTOMATED DIFF    Collection Time: 06/05/21  5:05 PM   Result Value Ref Range    WBC 7.6 4.1 - 11.1 K/uL    RBC 4.97 4.10 - 5.70 M/uL    HGB 14.3 12.1 - 17.0 g/dL    HCT 44.8 36.6 - 50.3 %    MCV 90.1 80.0 - 99.0 FL    MCH 28.8 26.0 - 34.0 PG    MCHC 31.9 30.0 - 36.5 g/dL    RDW 12.2 11.5 - 14.5 %    PLATELET 567 640 - 235 K/uL    MPV 9.6 8.9 - 12.9 FL    NRBC 0.0 0  WBC    ABSOLUTE NRBC 0.00 0.00 - 0.01 K/uL    NEUTROPHILS 46 32 - 75 %    LYMPHOCYTES 38 12 - 49 %    MONOCYTES 11 5 - 13 %    EOSINOPHILS 4 0 - 7 %    BASOPHILS 1 0 - 1 %    IMMATURE GRANULOCYTES 0 0.0 - 0.5 %    ABS. NEUTROPHILS 3.5 1.8 - 8.0 K/UL    ABS. LYMPHOCYTES 2.9 0.8 - 3.5 K/UL    ABS. MONOCYTES 0.8 0.0 - 1.0 K/UL    ABS. EOSINOPHILS 0.3 0.0 - 0.4 K/UL    ABS. BASOPHILS 0.1 0.0 - 0.1 K/UL    ABS. IMM.  GRANS. 0.0 0.00 - 0.04 K/UL    DF AUTOMATED     METABOLIC PANEL, BASIC    Collection Time: 06/05/21  5:05 PM   Result Value Ref Range    Sodium 139 136 - 145 mmol/L    Potassium 4.2 3.5 - 5.1 mmol/L    Chloride 107 97 - 108 mmol/L    CO2 31 21 - 32 mmol/L    Anion gap 1 (L) 5 - 15 mmol/L    Glucose 98 65 - 100 mg/dL    BUN 11 6 - 20 MG/DL    Creatinine 0.94 0.70 - 1.30 MG/DL    BUN/Creatinine ratio 12 12 - 20      GFR est AA >60 >60 ml/min/1.73m2    GFR est non-AA >60 >60 ml/min/1.73m2    Calcium 9.3 8.5 - 10.1 MG/DL        IMAGING:  No orders to display         Medications During Visit:  Medications   sodium chloride 0.9 % bolus infusion 1,000 mL (1,000 mL IntraVENous New Bag 6/5/21 8874)   prochlorperazine (COMPAZINE) with saline injection 10 mg (has no administration in time range)   tetracaine HCl (PF) (PONTOCAINE) 0.5 % ophthalmic solution 1 Drop (1 Drop Both Eyes Given 6/5/21 1746)   dexamethasone (PF) (DECADRON) 10 mg/mL injection 10 mg (10 mg IntraVENous Given 6/5/21 1746)   diphenhydrAMINE (BENADRYL) injection 25 mg (25 mg IntraVENous Given 6/5/21 1745)   ketorolac (TORADOL) injection 30 mg (30 mg IntraVENous Given 6/5/21 1745)         DECISION MAKING:  Annalisa Mast is a 38 y.o. male who comes in as above. Unremarkable neurological exam.  Headache not improved at all with high flow oxygen on nonrebreather. Normal intraocular pressures rule out angle-closure glaucoma. We will treat symptomatically with migraine cocktail and reassess symptoms. Handoff given to Sally Kent at 6:30 PM with plan to reevaluate symptoms. If headache remains severe after migraine cocktail, patient will require noncontrast head CT. If improved, he can follow-up outpatient with neurology and primary care. IMPRESSION:  No diagnosis found. DISPOSITION:        Current Discharge Medication List           Follow-up Information     Follow up With Specialties Details Why El Schwartz MD Neurology Schedule an appointment as soon as possible for a visit  As needed [address] [facility]  [phone]              The patient is asked to follow-up with their primary care provider in the next several days. They are to call tomorrow for an appointment. The patient is asked to return promptly for any increased concerns or worsening of symptoms. They can return to this emergency department or any other emergency department.

## 2022-03-18 PROBLEM — F31.9 BIPOLAR DISORDER (HCC): Status: ACTIVE | Noted: 2020-05-16

## 2022-03-19 PROBLEM — T50.901A OVERDOSE: Status: ACTIVE | Noted: 2020-05-14

## 2023-02-19 ENCOUNTER — APPOINTMENT (OUTPATIENT)
Dept: CT IMAGING | Age: 38
End: 2023-02-19
Attending: NURSE PRACTITIONER
Payer: COMMERCIAL

## 2023-02-19 ENCOUNTER — HOSPITAL ENCOUNTER (EMERGENCY)
Age: 38
Discharge: HOME | End: 2023-02-19
Attending: EMERGENCY MEDICINE
Payer: COMMERCIAL

## 2023-02-19 ENCOUNTER — APPOINTMENT (OUTPATIENT)
Dept: CT IMAGING | Age: 38
End: 2023-02-19
Payer: COMMERCIAL

## 2023-02-19 ENCOUNTER — APPOINTMENT (OUTPATIENT)
Dept: GENERAL RADIOLOGY | Age: 38
End: 2023-02-19
Payer: COMMERCIAL

## 2023-02-19 VITALS
DIASTOLIC BLOOD PRESSURE: 76 MMHG | HEART RATE: 96 BPM | HEIGHT: 72 IN | RESPIRATION RATE: 16 BRPM | SYSTOLIC BLOOD PRESSURE: 111 MMHG | OXYGEN SATURATION: 99 % | WEIGHT: 143 LBS | TEMPERATURE: 98.1 F | BODY MASS INDEX: 19.37 KG/M2

## 2023-02-19 DIAGNOSIS — M25.561 ACUTE PAIN OF RIGHT KNEE: ICD-10-CM
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
## 2023-02-19 DIAGNOSIS — D72.829 LEUKOCYTOSIS, UNSPECIFIED TYPE: ICD-10-CM

## 2023-02-19 DIAGNOSIS — R22.2 MASS OF ANTERIOR ABDOMINAL WALL: ICD-10-CM

## 2023-02-19 DIAGNOSIS — M25.511 ACUTE PAIN OF RIGHT SHOULDER: ICD-10-CM

## 2023-02-19 LAB
ANION GAP SERPL CALC-SCNC: 5 MMOL/L (ref 5–15)
BASOPHILS # BLD: 0.1 K/UL (ref 0–0.1)
BASOPHILS NFR BLD: 1 % (ref 0–1)
BUN SERPL-MCNC: 14 MG/DL (ref 6–20)
BUN/CREAT SERPL: 15 (ref 12–20)
CALCIUM SERPL-MCNC: 9.6 MG/DL (ref 8.5–10.1)
CHLORIDE SERPL-SCNC: 102 MMOL/L (ref 97–108)
CO2 SERPL-SCNC: 30 MMOL/L (ref 21–32)
COMMENT, HOLDF: NORMAL
CREAT SERPL-MCNC: 0.96 MG/DL (ref 0.7–1.3)
DIFFERENTIAL METHOD BLD: ABNORMAL
EOSINOPHIL # BLD: 0.3 K/UL (ref 0–0.4)
EOSINOPHIL NFR BLD: 2 % (ref 0–7)
ERYTHROCYTE [DISTWIDTH] IN BLOOD BY AUTOMATED COUNT: 13.2 % (ref 11.5–14.5)
GLUCOSE SERPL-MCNC: 117 MG/DL (ref 65–100)
HCT VFR BLD AUTO: 41.5 % (ref 36.6–50.3)
HGB BLD-MCNC: 13.6 G/DL (ref 12.1–17)
IMM GRANULOCYTES # BLD AUTO: 0.1 K/UL (ref 0–0.04)
IMM GRANULOCYTES NFR BLD AUTO: 0 % (ref 0–0.5)
LYMPHOCYTES # BLD: 2.6 K/UL (ref 0.8–3.5)
LYMPHOCYTES NFR BLD: 16 % (ref 12–49)
MCH RBC QN AUTO: 29.6 PG (ref 26–34)
MCHC RBC AUTO-ENTMCNC: 32.8 G/DL (ref 30–36.5)
MCV RBC AUTO: 90.4 FL (ref 80–99)
MONOCYTES # BLD: 1.4 K/UL (ref 0–1)
MONOCYTES NFR BLD: 9 % (ref 5–13)
NEUTS SEG # BLD: 11.8 K/UL (ref 1.8–8)
NEUTS SEG NFR BLD: 72 % (ref 32–75)
NRBC # BLD: 0 K/UL (ref 0–0.01)
NRBC BLD-RTO: 0 PER 100 WBC
PLATELET # BLD AUTO: 307 K/UL (ref 150–400)
PMV BLD AUTO: 9.6 FL (ref 8.9–12.9)
POTASSIUM SERPL-SCNC: 4 MMOL/L (ref 3.5–5.1)
RBC # BLD AUTO: 4.59 M/UL (ref 4.1–5.7)
SAMPLES BEING HELD,HOLD: NORMAL
SODIUM SERPL-SCNC: 137 MMOL/L (ref 136–145)
WBC # BLD AUTO: 16.3 K/UL (ref 4.1–11.1)

## 2023-02-19 PROCEDURE — 73030 X-RAY EXAM OF SHOULDER: CPT

## 2023-02-19 PROCEDURE — 74011000636 HC RX REV CODE- 636: Performed by: RADIOLOGY

## 2023-02-19 PROCEDURE — 85025 COMPLETE CBC W/AUTO DIFF WBC: CPT

## 2023-02-19 PROCEDURE — 74177 CT ABD & PELVIS W/CONTRAST: CPT

## 2023-02-19 PROCEDURE — 73562 X-RAY EXAM OF KNEE 3: CPT

## 2023-02-19 PROCEDURE — 80048 BASIC METABOLIC PNL TOTAL CA: CPT

## 2023-02-19 PROCEDURE — 36415 COLL VENOUS BLD VENIPUNCTURE: CPT

## 2023-02-19 PROCEDURE — 99285 EMERGENCY DEPT VISIT HI MDM: CPT

## 2023-02-19 PROCEDURE — 74011250637 HC RX REV CODE- 250/637

## 2023-02-19 RX ORDER — OXYCODONE AND ACETAMINOPHEN 5; 325 MG/1; MG/1
1 TABLET ORAL ONCE
Status: COMPLETED | OUTPATIENT
Start: 2023-02-19 | End: 2023-02-19

## 2023-02-19 RX ORDER — CEPHALEXIN 500 MG/1
500 CAPSULE ORAL 4 TIMES DAILY
Qty: 28 CAPSULE | Refills: 0 | Status: CANCELLED | OUTPATIENT
Start: 2023-02-19 | End: 2023-02-26

## 2023-02-19 RX ORDER — IBUPROFEN 800 MG/1
800 TABLET ORAL
Qty: 20 TABLET | Refills: 0 | Status: CANCELLED | OUTPATIENT
Start: 2023-02-19 | End: 2023-02-26

## 2023-02-19 RX ADMIN — IOPAMIDOL 100 ML: 755 INJECTION, SOLUTION INTRAVENOUS at 19:28

## 2023-02-19 RX ADMIN — OXYCODONE AND ACETAMINOPHEN 1 TABLET: 5; 325 TABLET ORAL at 19:06

## 2023-02-19 NOTE — Clinical Note
[address] [address]  [phone]    Work/School Note    Date: 2/19/2023    To Whom It May concern:    Claude Silk was seen and treated today in the emergency room by the following provider(s):  Attending Provider: Reese Nunez MD  Physician Assistant: Bud Hunt PA-C. Claude Silk is excused from work/school on 2/19/2023 through 2/21/2023. He is medically clear to return to work/school on 2/22/2023.          Sincerely,          Shellie Gonzalez PA-C
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
## 2023-02-20 RX ORDER — CEPHALEXIN 500 MG/1
500 CAPSULE ORAL 4 TIMES DAILY
Qty: 28 CAPSULE | Refills: 0 | Status: SHIPPED | OUTPATIENT
Start: 2023-02-20 | End: 2023-02-27
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
## 2023-02-21 ENCOUNTER — TELEPHONE (OUTPATIENT)
Dept: SURGERY | Age: 38
End: 2023-02-21

## 2023-02-21 ENCOUNTER — OFFICE VISIT (OUTPATIENT)
Dept: SURGERY | Age: 38
End: 2023-02-21
Payer: COMMERCIAL

## 2023-02-21 VITALS
WEIGHT: 138 LBS | HEART RATE: 93 BPM | DIASTOLIC BLOOD PRESSURE: 75 MMHG | TEMPERATURE: 98.5 F | SYSTOLIC BLOOD PRESSURE: 103 MMHG | HEIGHT: 72 IN | BODY MASS INDEX: 18.69 KG/M2 | RESPIRATION RATE: 20 BRPM | OXYGEN SATURATION: 98 %

## 2023-02-21 DIAGNOSIS — K42.0 INCARCERATED UMBILICAL HERNIA: Primary | ICD-10-CM

## 2023-02-21 PROBLEM — K42.9 UMBILICAL HERNIA WITHOUT OBSTRUCTION AND WITHOUT GANGRENE: Status: ACTIVE | Noted: 2023-02-21

## 2023-02-21 PROCEDURE — 99203 OFFICE O/P NEW LOW 30 MIN: CPT | Performed by: SURGERY

## 2023-02-21 NOTE — PROGRESS NOTES
Surgery Consult    Subjective:      Jeanine Turner is a 44 y.o. male who is being seen for umbilical hernia. Patient has symptoms of bulging, mass, pain, which are made worse with coughing, lifting, standing. Symptoms were first noted 3 days ago. He was evaluated in our emergency department for the symptoms with work-up which included CT abdomen/pelvis. Pain is sharp, intermittent. Lump is not reducible. Patient does not have symptoms of chronic constipation, chronic cough, difficulty urinating. Patient does not have a history of previous hernia surgery. Patient Active Problem List    Diagnosis Date Noted    Bipolar affective disorder ([facility] 12/05/2011    Incarcerated umbilical hernia 30/90/8599    Bipolar disorder ([facility] 05/16/2020    Overdose 05/14/2020    Mood swings 06/10/2011    Self mutilation 06/10/2011      Past Medical History:   Diagnosis Date    Anxiety disorder     [facility] 12/5/2011    Bipolar disorder ([facility]     Chronic pain     right foot and back pain    Depression     Mood disorder ([facility]     Self mutilation     hx of cutting self    Substance abuse ([facility]     Suicidal thoughts      History reviewed. No pertinent surgical history. History reviewed. No pertinent family history. Social History     Tobacco Use    Smoking status: Every Day     Packs/day: 1.00     Years: 15.00     Pack years: 15.00     Types: Cigarettes    Smokeless tobacco: Never   Substance Use Topics    Alcohol use: Not Currently     Comment: Occasionally 10 beers a week      Current Outpatient Medications   Medication Sig    cephALEXin (Keflex) 500 mg capsule Take 1 Capsule by mouth four (4) times daily for 7 days. ARIPiprazole (ABILIFY) 5 mg tablet Take 5 mg by mouth daily. zolpidem (AMBIEN) 5 mg tablet Take 1 Tablet by mouth nightly as needed for Sleep. Max Daily Amount: 5 mg.    rimegepant (NURTEC) 75 mg disintegrating tablet Take 1 Tablet by mouth daily as needed for Migraine. butalbital-acetaminophen-caffeine (FIORICET, ESGIC) -40 mg per tablet Take 1-2 Tablets by mouth four (4) times daily as needed for Migraine. clonazePAM (KlonoPIN) 1 mg tablet Take 1 Tablet by mouth two (2) times daily as needed for Anxiety. Max Daily Amount: 2 mg.    lithium carbonate 300 mg capsule Take 1,200 mg by mouth nightly. loratadine (CLARITIN) 10 mg tablet Take 10 mg by mouth.    polyethylene glycol (MIRALAX) 17 gram packet Take 1 Packet by mouth daily. (Patient not taking: Reported on 2/21/2023)     No current facility-administered medications for this visit. Allergies   Allergen Reactions    Ceclor [Cefaclor] Unknown (comments)        Review of Systems:  A complete review of systems was negative except as noted in the HPI. Objective:     Visit Vitals  /75 (BP 1 Location: Right upper arm, BP Patient Position: Sitting, BP Cuff Size: Large adult)   Pulse 93   Temp 98.5 °F (36.9 °C)   Resp 20   Ht 6' (1.829 m)   Wt 138 lb (62.6 kg)   SpO2 98%   BMI 18.72 kg/m²       Physical Exam:  GENERAL: alert, cooperative, no distress, appears stated age, cachectic, EYE: negative, LYMPHATIC: No cervical or supraclavicular adenopathy. THROAT & NECK: normal, LUNG: clear to auscultation bilaterally, HEART: regular rate and rhythm, S1, S2 normal, no murmur. ABDOMEN: NABS, scaphoid, soft. Tender, nonreducible bulge to right of umbilicus. No cellulitis of surrounding skin. EXTREMITIES:  extremities normal, atraumatic, no cyanosis or edema, SKIN: Normal., NEUROLOGIC: negative. Data Review: CT abdomen/pelvis: 2 cm lobule of mesenteric fat herniated through small umbilical defect, identified to right of umbilicus. Assessment:     Incarcerated umbilical hernia which is tender to palpation. He is unsure whether his insurance is accepted at [facility].  Given small size of defect, incarcerated nature of contents, I recommended open umbilical hernia repair.   Technical aspects of procedure reviewed along with risks (include but not limited to bleeding, wound infection, recurrence, visceral injury). Also discussed the anticipated outpatient nature of the procedure and postoperative activity restrictions. All questions answered. Plan:     1. Patient will contact his insurance provider to better ascertain whether hernia repair would be a covered benefit if performed at [facility].  He will contact our office if coverage is confirmed. 2.  If he is not covered at [facility], he will contact our office to obtain care card application and move forward with scheduling once approved.

## 2023-02-21 NOTE — LETTER
2/21/2023 11:30 AM    Mr. Claude Silk  [address] [address]  . To Whom It May Concern:    Claude Silk was under the care of Andrew Mcnair on 2/21/2023. He was recently evaluated in our emergency department for abdominal pain; on today's office visit, he is noted to have a symptomatic umbilical hernia. He is interested in scheduling repair.       Sincerely,      Edwige Amezquita MD

## 2023-02-21 NOTE — TELEPHONE ENCOUNTER
Guzman Abbasi with [facility] called wanting to know if our office is in network with the [employer] plan. Informed him that our office is in network but [place]'s is not. Jai Noe the number to our sister practice,   [facility] whom takes the patients insurance. Patient will call to schedule the appointment.      Ref# [id number]

## 2023-02-28 ENCOUNTER — OFFICE VISIT (OUTPATIENT)
Dept: SURGERY | Age: 38
End: 2023-02-28
Payer: COMMERCIAL

## 2023-02-28 VITALS
SYSTOLIC BLOOD PRESSURE: 110 MMHG | OXYGEN SATURATION: 98 % | BODY MASS INDEX: 19.59 KG/M2 | HEART RATE: 74 BPM | TEMPERATURE: 97.9 F | WEIGHT: 144.6 LBS | HEIGHT: 72 IN | DIASTOLIC BLOOD PRESSURE: 62 MMHG

## 2023-02-28 DIAGNOSIS — M79.89 SOFT TISSUE MASS: Primary | ICD-10-CM

## 2023-02-28 PROCEDURE — 99203 OFFICE O/P NEW LOW 30 MIN: CPT | Performed by: SURGERY
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
## 2023-02-28 NOTE — PROGRESS NOTES
Identified pt with two pt identifiers (name and ). Reviewed chart in preparation for visit and have obtained necessary documentation. Lv Garcia is a 44 y.o. male  Chief Complaint   Patient presents with    New Patient     umbilical hernia     Visit Vitals  /62 (BP 1 Location: Left upper arm, BP Patient Position: Sitting, BP Cuff Size: Adult)   Pulse 74   Temp 97.9 °F (36.6 °C) (Temporal)   Ht 6' (1.829 m)   Wt 144 lb 9.6 oz (65.6 kg)   SpO2 98%   BMI 19.61 kg/m²       1. Have you been to the ER, urgent care clinic since your last visit? Hospitalized since your last visit? Yes When: 23 [facility]     2. Have you seen or consulted any other health care providers outside of the [address] [place] [address] since your last visit? Include any pap smears or colon screening.  No

## 2023-08-06 ENCOUNTER — HOSPITAL ENCOUNTER (EMERGENCY)
Facility: HOSPITAL | Age: 38
Discharge: HOME | End: 2023-08-06
Attending: EMERGENCY MEDICINE
Payer: COMMERCIAL

## 2023-08-06 VITALS
DIASTOLIC BLOOD PRESSURE: 70 MMHG | HEART RATE: 96 BPM | OXYGEN SATURATION: 100 % | TEMPERATURE: 98.1 F | SYSTOLIC BLOOD PRESSURE: 111 MMHG | RESPIRATION RATE: 16 BRPM

## 2023-08-06 DIAGNOSIS — E03.9 HYPOTHYROIDISM, UNSPECIFIED TYPE: Primary | ICD-10-CM

## 2023-08-06 LAB
ALBUMIN SERPL-MCNC: 4.2 G/DL (ref 3.5–5)
ALBUMIN/GLOB SERPL: 1.1 (ref 1.1–2.2)
ALP SERPL-CCNC: 56 U/L (ref 45–117)
ALT SERPL-CCNC: 36 U/L (ref 12–78)
ANION GAP SERPL CALC-SCNC: 1 MMOL/L (ref 5–15)
AST SERPL-CCNC: 20 U/L (ref 15–37)
BASOPHILS # BLD: 0.1 K/UL (ref 0–0.1)
BASOPHILS NFR BLD: 1 % (ref 0–1)
BILIRUB SERPL-MCNC: 0.2 MG/DL (ref 0.2–1)
BUN SERPL-MCNC: 14 MG/DL (ref 6–20)
BUN/CREAT SERPL: 18 (ref 12–20)
CALCIUM SERPL-MCNC: 9.6 MG/DL (ref 8.5–10.1)
CHLORIDE SERPL-SCNC: 105 MMOL/L (ref 97–108)
CO2 SERPL-SCNC: 31 MMOL/L (ref 21–32)
COMMENT:: NORMAL
CREAT SERPL-MCNC: 0.77 MG/DL (ref 0.7–1.3)
DIFFERENTIAL METHOD BLD: ABNORMAL
EOSINOPHIL # BLD: 0.1 K/UL (ref 0–0.4)
EOSINOPHIL NFR BLD: 2 % (ref 0–7)
ERYTHROCYTE [DISTWIDTH] IN BLOOD BY AUTOMATED COUNT: 14.3 % (ref 11.5–14.5)
ETHANOL SERPL-MCNC: <10 MG/DL (ref 0–0.08)
GLOBULIN SER CALC-MCNC: 3.7 G/DL (ref 2–4)
GLUCOSE SERPL-MCNC: 92 MG/DL (ref 65–100)
HCT VFR BLD AUTO: 45.8 % (ref 36.6–50.3)
HGB BLD-MCNC: 14.5 G/DL (ref 12.1–17)
IMM GRANULOCYTES # BLD AUTO: 0 K/UL (ref 0–0.04)
IMM GRANULOCYTES NFR BLD AUTO: 1 % (ref 0–0.5)
LYMPHOCYTES # BLD: 2.1 K/UL (ref 0.8–3.5)
LYMPHOCYTES NFR BLD: 26 % (ref 12–49)
MCH RBC QN AUTO: 29.2 PG (ref 26–34)
MCHC RBC AUTO-ENTMCNC: 31.7 G/DL (ref 30–36.5)
MCV RBC AUTO: 92.2 FL (ref 80–99)
MONOCYTES # BLD: 0.8 K/UL (ref 0–1)
MONOCYTES NFR BLD: 10 % (ref 5–13)
NEUTS SEG # BLD: 4.8 K/UL (ref 1.8–8)
NEUTS SEG NFR BLD: 60 % (ref 32–75)
NRBC # BLD: 0 K/UL (ref 0–0.01)
NRBC BLD-RTO: 0 PER 100 WBC
PLATELET # BLD AUTO: 344 K/UL (ref 150–400)
PMV BLD AUTO: 10 FL (ref 8.9–12.9)
POTASSIUM SERPL-SCNC: 4.3 MMOL/L (ref 3.5–5.1)
PROT SERPL-MCNC: 7.9 G/DL (ref 6.4–8.2)
RBC # BLD AUTO: 4.97 M/UL (ref 4.1–5.7)
SODIUM SERPL-SCNC: 137 MMOL/L (ref 136–145)
SPECIMEN HOLD: NORMAL
T4 FREE SERPL-MCNC: 0.7 NG/DL (ref 0.8–1.5)
TSH SERPL DL<=0.05 MIU/L-ACNC: 14.6 UIU/ML (ref 0.36–3.74)
WBC # BLD AUTO: 8 K/UL (ref 4.1–11.1)

## 2023-08-06 PROCEDURE — 85025 COMPLETE CBC W/AUTO DIFF WBC: CPT

## 2023-08-06 PROCEDURE — 80053 COMPREHEN METABOLIC PANEL: CPT

## 2023-08-06 PROCEDURE — 2580000003 HC RX 258: Performed by: PHYSICIAN ASSISTANT

## 2023-08-06 PROCEDURE — 99284 EMERGENCY DEPT VISIT MOD MDM: CPT

## 2023-08-06 PROCEDURE — 96374 THER/PROPH/DIAG INJ IV PUSH: CPT

## 2023-08-06 PROCEDURE — 6370000000 HC RX 637 (ALT 250 FOR IP): Performed by: PHYSICIAN ASSISTANT

## 2023-08-06 PROCEDURE — 36415 COLL VENOUS BLD VENIPUNCTURE: CPT

## 2023-08-06 PROCEDURE — 82077 ASSAY SPEC XCP UR&BREATH IA: CPT

## 2023-08-06 PROCEDURE — 84443 ASSAY THYROID STIM HORMONE: CPT

## 2023-08-06 PROCEDURE — 84439 ASSAY OF FREE THYROXINE: CPT

## 2023-08-06 PROCEDURE — 6360000002 HC RX W HCPCS: Performed by: PHYSICIAN ASSISTANT

## 2023-08-06 RX ORDER — ONDANSETRON 2 MG/ML
4 INJECTION INTRAMUSCULAR; INTRAVENOUS
Status: COMPLETED | OUTPATIENT
Start: 2023-08-06 | End: 2023-08-06

## 2023-08-06 RX ORDER — LEVOTHYROXINE SODIUM 0.05 MG/1
50 TABLET ORAL
Status: COMPLETED | OUTPATIENT
Start: 2023-08-06 | End: 2023-08-06

## 2023-08-06 RX ORDER — 0.9 % SODIUM CHLORIDE 0.9 %
1000 INTRAVENOUS SOLUTION INTRAVENOUS ONCE
Status: COMPLETED | OUTPATIENT
Start: 2023-08-06 | End: 2023-08-06

## 2023-08-06 RX ORDER — IBUPROFEN 400 MG/1
600 TABLET ORAL
Status: COMPLETED | OUTPATIENT
Start: 2023-08-06 | End: 2023-08-06

## 2023-08-06 RX ADMIN — IBUPROFEN 600 MG: 400 TABLET, FILM COATED ORAL at 14:00

## 2023-08-06 RX ADMIN — ONDANSETRON 4 MG: 2 INJECTION INTRAMUSCULAR; INTRAVENOUS at 14:02

## 2023-08-06 RX ADMIN — SODIUM CHLORIDE 1000 ML: 9 INJECTION, SOLUTION INTRAVENOUS at 14:02

## 2023-08-06 RX ADMIN — LEVOTHYROXINE SODIUM 50 MCG: 0.05 TABLET ORAL at 15:00

## 2023-08-06 ASSESSMENT — ENCOUNTER SYMPTOMS
NAUSEA: 1
VOMITING: 0
CONSTIPATION: 0
DIARRHEA: 0
ABDOMINAL PAIN: 1
SHORTNESS OF BREATH: 0

## 2023-08-06 ASSESSMENT — PAIN SCALES - GENERAL: PAINLEVEL_OUTOF10: 3

## 2023-08-06 ASSESSMENT — PAIN DESCRIPTION - LOCATION: LOCATION: OTHER (COMMENT)

## 2023-08-06 ASSESSMENT — PAIN - FUNCTIONAL ASSESSMENT: PAIN_FUNCTIONAL_ASSESSMENT: NONE - DENIES PAIN

## 2023-08-06 NOTE — ED TRIAGE NOTES
Patient states he is feeling general weakness. He states he quit abusing cocaine and suboxone 9 days ago but this does not feel like withdrawal hes had in the past. He endorses generally feeling unwell and also has hypothyroidism and has not been taking his levothyroxine.

## 2023-08-06 NOTE — ED PROVIDER NOTES
nursing note reviewed. Constitutional:       General: He is not in acute distress. Appearance: He is not diaphoretic. HENT:      Head: Normocephalic. Eyes:      Extraocular Movements: Extraocular movements intact. Conjunctiva/sclera: Conjunctivae normal.   Cardiovascular:      Rate and Rhythm: Normal rate and regular rhythm. Heart sounds: Normal heart sounds. Pulmonary:      Effort: Pulmonary effort is normal.      Breath sounds: Normal breath sounds. Abdominal:      General: There is no distension. Palpations: Abdomen is soft. Tenderness: There is no abdominal tenderness. There is no guarding or rebound. Skin:     General: Skin is warm and dry. Neurological:      Mental Status: He is alert.    Psychiatric:         Mood and Affect: Mood normal.         Behavior: Behavior normal.       DIAGNOSTIC RESULTS     EKG: All EKG's are interpreted by the Emergency Department Physician who either signs or Co-signs this chart in the absence of a cardiologist.        RADIOLOGY:   Non-plain film images such as CT, Ultrasound and MRI are read by the radiologist. Plain radiographic images are visualized and preliminarily interpreted by the emergency physician with the below findings:        Interpretation per the Radiologist below, if available at the time of this note:    No orders to display        LABS:  Labs Reviewed   CBC WITH AUTO DIFFERENTIAL - Abnormal; Notable for the following components:       Result Value    Immature Granulocytes 1 (*)     All other components within normal limits   COMPREHENSIVE METABOLIC PANEL - Abnormal; Notable for the following components:    Anion Gap 1 (*)     All other components within normal limits   TSH - Abnormal; Notable for the following components:    TSH, 3RD GENERATION 14.60 (*)     All other components within normal limits   T4, FREE - Abnormal; Notable for the following components:    T4 Free 0.7 (*)     All other components within normal limits

## 2023-08-06 NOTE — ED NOTES
Patient given a meal of Lasagna. Medication and discharged with papers in hand.      Trinidad Enriquez, BLOSSOM  37/33/96 [id number]

## 2023-08-13 ENCOUNTER — HOSPITAL ENCOUNTER (EMERGENCY)
Facility: HOSPITAL | Age: 38
Discharge: HOME | End: 2023-08-13
Attending: EMERGENCY MEDICINE
Payer: COMMERCIAL

## 2023-08-13 VITALS
RESPIRATION RATE: 18 BRPM | HEART RATE: 94 BPM | SYSTOLIC BLOOD PRESSURE: 106 MMHG | DIASTOLIC BLOOD PRESSURE: 69 MMHG | TEMPERATURE: 98 F | OXYGEN SATURATION: 100 %

## 2023-08-13 DIAGNOSIS — R53.83 MALAISE AND FATIGUE: ICD-10-CM

## 2023-08-13 DIAGNOSIS — R53.81 MALAISE AND FATIGUE: ICD-10-CM

## 2023-08-13 DIAGNOSIS — E03.9 HYPOTHYROIDISM, UNSPECIFIED TYPE: Primary | ICD-10-CM

## 2023-08-13 LAB
ALBUMIN SERPL-MCNC: 3.8 G/DL (ref 3.5–5)
ALBUMIN/GLOB SERPL: 1.1 (ref 1.1–2.2)
ALP SERPL-CCNC: 50 U/L (ref 45–117)
ALT SERPL-CCNC: 71 U/L (ref 12–78)
ANION GAP SERPL CALC-SCNC: 3 MMOL/L (ref 5–15)
AST SERPL-CCNC: 26 U/L (ref 15–37)
BASOPHILS # BLD: 0.1 K/UL (ref 0–0.1)
BASOPHILS NFR BLD: 1 % (ref 0–1)
BILIRUB SERPL-MCNC: 0.2 MG/DL (ref 0.2–1)
BUN SERPL-MCNC: 17 MG/DL (ref 6–20)
BUN/CREAT SERPL: 20 (ref 12–20)
CALCIUM SERPL-MCNC: 9.3 MG/DL (ref 8.5–10.1)
CHLORIDE SERPL-SCNC: 106 MMOL/L (ref 97–108)
CO2 SERPL-SCNC: 28 MMOL/L (ref 21–32)
COMMENT:: NORMAL
CREAT SERPL-MCNC: 0.84 MG/DL (ref 0.7–1.3)
DIFFERENTIAL METHOD BLD: ABNORMAL
EOSINOPHIL # BLD: 0.2 K/UL (ref 0–0.4)
EOSINOPHIL NFR BLD: 3 % (ref 0–7)
ERYTHROCYTE [DISTWIDTH] IN BLOOD BY AUTOMATED COUNT: 14.4 % (ref 11.5–14.5)
GLOBULIN SER CALC-MCNC: 3.5 G/DL (ref 2–4)
GLUCOSE SERPL-MCNC: 103 MG/DL (ref 65–100)
HCT VFR BLD AUTO: 42.7 % (ref 36.6–50.3)
HGB BLD-MCNC: 13.3 G/DL (ref 12.1–17)
IMM GRANULOCYTES # BLD AUTO: 0 K/UL (ref 0–0.04)
IMM GRANULOCYTES NFR BLD AUTO: 1 % (ref 0–0.5)
LIPASE SERPL-CCNC: 363 U/L (ref 73–393)
LYMPHOCYTES # BLD: 2.6 K/UL (ref 0.8–3.5)
LYMPHOCYTES NFR BLD: 34 % (ref 12–49)
MAGNESIUM SERPL-MCNC: 2.4 MG/DL (ref 1.6–2.4)
MCH RBC QN AUTO: 28.7 PG (ref 26–34)
MCHC RBC AUTO-ENTMCNC: 31.1 G/DL (ref 30–36.5)
MCV RBC AUTO: 92.2 FL (ref 80–99)
MONOCYTES # BLD: 0.9 K/UL (ref 0–1)
MONOCYTES NFR BLD: 12 % (ref 5–13)
NEUTS SEG # BLD: 3.7 K/UL (ref 1.8–8)
NEUTS SEG NFR BLD: 49 % (ref 32–75)
NRBC # BLD: 0 K/UL (ref 0–0.01)
NRBC BLD-RTO: 0 PER 100 WBC
PLATELET # BLD AUTO: 287 K/UL (ref 150–400)
PMV BLD AUTO: 9.8 FL (ref 8.9–12.9)
POTASSIUM SERPL-SCNC: 4.2 MMOL/L (ref 3.5–5.1)
PROT SERPL-MCNC: 7.3 G/DL (ref 6.4–8.2)
RBC # BLD AUTO: 4.63 M/UL (ref 4.1–5.7)
SODIUM SERPL-SCNC: 137 MMOL/L (ref 136–145)
SPECIMEN HOLD: NORMAL
T4 FREE SERPL-MCNC: 0.8 NG/DL (ref 0.8–1.5)
TSH SERPL DL<=0.05 MIU/L-ACNC: 7.02 UIU/ML (ref 0.36–3.74)
WBC # BLD AUTO: 7.5 K/UL (ref 4.1–11.1)

## 2023-08-13 PROCEDURE — 83735 ASSAY OF MAGNESIUM: CPT

## 2023-08-13 PROCEDURE — 84443 ASSAY THYROID STIM HORMONE: CPT

## 2023-08-13 PROCEDURE — 85025 COMPLETE CBC W/AUTO DIFF WBC: CPT

## 2023-08-13 PROCEDURE — 80053 COMPREHEN METABOLIC PANEL: CPT

## 2023-08-13 PROCEDURE — 96360 HYDRATION IV INFUSION INIT: CPT

## 2023-08-13 PROCEDURE — 99284 EMERGENCY DEPT VISIT MOD MDM: CPT

## 2023-08-13 PROCEDURE — 84439 ASSAY OF FREE THYROXINE: CPT

## 2023-08-13 PROCEDURE — 83690 ASSAY OF LIPASE: CPT

## 2023-08-13 PROCEDURE — 36415 COLL VENOUS BLD VENIPUNCTURE: CPT

## 2023-08-13 PROCEDURE — 2580000003 HC RX 258: Performed by: STUDENT IN AN ORGANIZED HEALTH CARE EDUCATION/TRAINING PROGRAM

## 2023-08-13 RX ORDER — 0.9 % SODIUM CHLORIDE 0.9 %
1000 INTRAVENOUS SOLUTION INTRAVENOUS ONCE
Status: COMPLETED | OUTPATIENT
Start: 2023-08-13 | End: 2023-08-13

## 2023-08-13 RX ADMIN — SODIUM CHLORIDE 1000 ML: 9 INJECTION, SOLUTION INTRAVENOUS at 18:46

## 2023-08-13 ASSESSMENT — ENCOUNTER SYMPTOMS
EYE PAIN: 0
SORE THROAT: 0
NAUSEA: 0
DIARRHEA: 0
VOMITING: 0
COUGH: 0
SHORTNESS OF BREATH: 0
ABDOMINAL PAIN: 0

## 2023-08-13 NOTE — ED PROVIDER NOTES
[facility] EMERGENCY [facility]      Pt Name: May Polk  MRN: [id number]  [address] [place] 1985  Date of evaluation: 8/13/2023  Provider: Velia Jha, [facility]       Chief Complaint   Patient presents with    Fatigue         HISTORY OF PRESENT ILLNESS   (Location/Symptom, Timing/Onset, Context/Setting, Quality, Duration, Modifying Factors, Severity)  Note limiting factors. 44 y.o. male with history of acquired hypothyroidism, anxiety, bipolar disorder, depression and substance abuse presents to ED with fatigue, myalgias and lack of appetite. Patient reports that for the past 2 weeks he has had generalized fatigue, malaise, lack of appetite and myalgias. He came to this ER 7 days ago with the same symptoms and had full work-up and was told that this was likely due to his thyroid function as he was in hypothyroid. He reports that he has a known history of this and was previously on levothyroxine about a year ago but then had a relapse with his substance abuse abuse and stopped taking this. He started taking this again after his ER visit but reports that he was expecting to notice immediate relief of symptoms and has not yet. Otherwise denies any change in his symptoms and does report that he feels like there is a slight improvement just not all the way. He has not made an appointment with his PCP or endocrinologist yet. Otherwise denies any fevers, chills, chest pain, shortness of breath, nausea, vomiting or diarrhea. Review of External Medical Records:     Nursing Notes were reviewed. REVIEW OF SYSTEMS    (2-9 systems for level 4, 10 or more for level 5)     Review of Systems   Constitutional:  Positive for fatigue. Negative for chills and fever. HENT:  Negative for congestion, ear pain and sore throat. Eyes:  Negative for pain. Respiratory:  Negative for cough and shortness of breath. Cardiovascular:  Negative for chest pain.    Gastrointestinal:

## 2023-08-13 NOTE — ED TRIAGE NOTES
TRIAGE NOTE:  Patient arrives ambulatory with c/o weakness. Patient reports his thyroid is all screwed up. Patient reports \"I can barely stand, I have to pee all the time, and i'm super fatigued\". Patient reports has been going on for 2 weeks and was seen last Sunday and was placed on levothyroxine and patient reports has been taking for past 7 days with no improvement.

## 2023-09-03 ENCOUNTER — APPOINTMENT (OUTPATIENT)
Facility: HOSPITAL | Age: 38
End: 2023-09-03
Payer: COMMERCIAL

## 2023-09-03 ENCOUNTER — HOSPITAL ENCOUNTER (EMERGENCY)
Facility: HOSPITAL | Age: 38
Discharge: HOME | End: 2023-09-03
Attending: STUDENT IN AN ORGANIZED HEALTH CARE EDUCATION/TRAINING PROGRAM
Payer: COMMERCIAL

## 2023-09-03 VITALS
OXYGEN SATURATION: 100 % | DIASTOLIC BLOOD PRESSURE: 89 MMHG | SYSTOLIC BLOOD PRESSURE: 124 MMHG | TEMPERATURE: 98.1 F | HEART RATE: 80 BPM | RESPIRATION RATE: 18 BRPM

## 2023-09-03 DIAGNOSIS — R07.89 CHEST WALL PAIN: Primary | ICD-10-CM

## 2023-09-03 LAB
ALBUMIN SERPL-MCNC: 3.6 G/DL (ref 3.5–5)
ALBUMIN/GLOB SERPL: 1 (ref 1.1–2.2)
ALP SERPL-CCNC: 55 U/L (ref 45–117)
ALT SERPL-CCNC: 24 U/L (ref 12–78)
ANION GAP SERPL CALC-SCNC: 1 MMOL/L (ref 5–15)
AST SERPL-CCNC: 11 U/L (ref 15–37)
BASOPHILS # BLD: 0 K/UL (ref 0–0.1)
BASOPHILS NFR BLD: 0 % (ref 0–1)
BILIRUB SERPL-MCNC: 0.1 MG/DL (ref 0.2–1)
BUN SERPL-MCNC: 15 MG/DL (ref 6–20)
BUN/CREAT SERPL: 18 (ref 12–20)
CALCIUM SERPL-MCNC: 9.4 MG/DL (ref 8.5–10.1)
CHLORIDE SERPL-SCNC: 109 MMOL/L (ref 97–108)
CO2 SERPL-SCNC: 32 MMOL/L (ref 21–32)
COMMENT:: NORMAL
CREAT SERPL-MCNC: 0.83 MG/DL (ref 0.7–1.3)
DIFFERENTIAL METHOD BLD: NORMAL
EOSINOPHIL # BLD: 0 K/UL (ref 0–0.4)
EOSINOPHIL NFR BLD: 0 % (ref 0–7)
ERYTHROCYTE [DISTWIDTH] IN BLOOD BY AUTOMATED COUNT: 13.3 % (ref 11.5–14.5)
GLOBULIN SER CALC-MCNC: 3.5 G/DL (ref 2–4)
GLUCOSE SERPL-MCNC: 117 MG/DL (ref 65–100)
HCT VFR BLD AUTO: 39.8 % (ref 36.6–50.3)
HGB BLD-MCNC: 12.8 G/DL (ref 12.1–17)
IMM GRANULOCYTES # BLD AUTO: 0 K/UL
IMM GRANULOCYTES NFR BLD AUTO: 0 %
LYMPHOCYTES # BLD: 2.9 K/UL (ref 0.8–3.5)
LYMPHOCYTES NFR BLD: 48 % (ref 12–49)
MCH RBC QN AUTO: 29 PG (ref 26–34)
MCHC RBC AUTO-ENTMCNC: 32.2 G/DL (ref 30–36.5)
MCV RBC AUTO: 90.2 FL (ref 80–99)
MONOCYTES # BLD: 0.6 K/UL (ref 0–1)
MONOCYTES NFR BLD: 11 % (ref 5–13)
NEUTS SEG # BLD: 2.4 K/UL (ref 1.8–8)
NEUTS SEG NFR BLD: 41 % (ref 32–75)
NRBC # BLD: 0 K/UL (ref 0–0.01)
NRBC BLD-RTO: 0 PER 100 WBC
PLATELET # BLD AUTO: 273 K/UL (ref 150–400)
PMV BLD AUTO: 9.5 FL (ref 8.9–12.9)
POTASSIUM SERPL-SCNC: 4.4 MMOL/L (ref 3.5–5.1)
PROT SERPL-MCNC: 7.1 G/DL (ref 6.4–8.2)
RBC # BLD AUTO: 4.41 M/UL (ref 4.1–5.7)
RBC MORPH BLD: NORMAL
SODIUM SERPL-SCNC: 142 MMOL/L (ref 136–145)
SPECIMEN HOLD: NORMAL
TROPONIN I SERPL HS-MCNC: <4 NG/L (ref 0–76)
WBC # BLD AUTO: 5.9 K/UL (ref 4.1–11.1)
WBC MORPH BLD: NORMAL

## 2023-09-03 PROCEDURE — 36415 COLL VENOUS BLD VENIPUNCTURE: CPT

## 2023-09-03 PROCEDURE — 80053 COMPREHEN METABOLIC PANEL: CPT

## 2023-09-03 PROCEDURE — 71046 X-RAY EXAM CHEST 2 VIEWS: CPT

## 2023-09-03 PROCEDURE — 6370000000 HC RX 637 (ALT 250 FOR IP): Performed by: STUDENT IN AN ORGANIZED HEALTH CARE EDUCATION/TRAINING PROGRAM

## 2023-09-03 PROCEDURE — 85025 COMPLETE CBC W/AUTO DIFF WBC: CPT

## 2023-09-03 PROCEDURE — 6360000002 HC RX W HCPCS: Performed by: STUDENT IN AN ORGANIZED HEALTH CARE EDUCATION/TRAINING PROGRAM

## 2023-09-03 PROCEDURE — 96374 THER/PROPH/DIAG INJ IV PUSH: CPT

## 2023-09-03 PROCEDURE — 99285 EMERGENCY DEPT VISIT HI MDM: CPT

## 2023-09-03 PROCEDURE — 84484 ASSAY OF TROPONIN QUANT: CPT

## 2023-09-03 RX ORDER — LIDOCAINE 50 MG/G
1 PATCH TOPICAL DAILY
Qty: 10 PATCH | Refills: 0 | Status: SHIPPED | OUTPATIENT
Start: 2023-09-03 | End: 2023-09-13

## 2023-09-03 RX ORDER — LIDOCAINE 4 G/G
1 PATCH TOPICAL
Status: DISCONTINUED | OUTPATIENT
Start: 2023-09-03 | End: 2023-09-04 | Stop reason: HOSPADM

## 2023-09-03 RX ORDER — KETOROLAC TROMETHAMINE 30 MG/ML
15 INJECTION, SOLUTION INTRAMUSCULAR; INTRAVENOUS
Status: COMPLETED | OUTPATIENT
Start: 2023-09-03 | End: 2023-09-03

## 2023-09-03 RX ORDER — CYCLOBENZAPRINE HCL 10 MG
10 TABLET ORAL ONCE
Status: COMPLETED | OUTPATIENT
Start: 2023-09-03 | End: 2023-09-03

## 2023-09-03 RX ORDER — KETOROLAC TROMETHAMINE 30 MG/ML
15 INJECTION, SOLUTION INTRAMUSCULAR; INTRAVENOUS ONCE
Status: DISCONTINUED | OUTPATIENT
Start: 2023-09-03 | End: 2023-09-03

## 2023-09-03 RX ADMIN — CYCLOBENZAPRINE 10 MG: 10 TABLET, FILM COATED ORAL at 20:00

## 2023-09-03 RX ADMIN — KETOROLAC TROMETHAMINE 15 MG: 30 INJECTION, SOLUTION INTRAMUSCULAR; INTRAVENOUS at 22:44

## 2023-09-03 ASSESSMENT — PAIN DESCRIPTION - ORIENTATION: ORIENTATION: MID;LEFT;RIGHT

## 2023-09-03 ASSESSMENT — PAIN DESCRIPTION - LOCATION
LOCATION: CHEST

## 2023-09-03 ASSESSMENT — PAIN SCALES - GENERAL
PAINLEVEL_OUTOF10: 9

## 2023-09-03 ASSESSMENT — PAIN DESCRIPTION - DESCRIPTORS: DESCRIPTORS: PRESSURE

## 2023-09-03 ASSESSMENT — PAIN DESCRIPTION - PAIN TYPE
TYPE: ACUTE PAIN
TYPE: ACUTE PAIN

## 2023-09-03 ASSESSMENT — PAIN - FUNCTIONAL ASSESSMENT
PAIN_FUNCTIONAL_ASSESSMENT: 0-10
PAIN_FUNCTIONAL_ASSESSMENT: 0-10

## 2023-09-04 NOTE — DISCHARGE INSTRUCTIONS
You were seen today for evaluation of chest pain, given your recent chest trauma this is likely cause of musculoskeletal pain. Your work-up was reassuring. You are recommended to take Tylenol and Motrin, alternating for best pain control. You have been given a lidocaine patch to apply daily as needed for pain.   The emergency department if you have worsening chest pain, difficulty breathing, fainting, or any other new or concerning symptoms    Thank you for letting us take care of you, hope you feel better soon,  Didier Hays MD